# Patient Record
Sex: FEMALE | Race: WHITE | NOT HISPANIC OR LATINO | Employment: OTHER | URBAN - NONMETROPOLITAN AREA
[De-identification: names, ages, dates, MRNs, and addresses within clinical notes are randomized per-mention and may not be internally consistent; named-entity substitution may affect disease eponyms.]

---

## 2019-02-22 ENCOUNTER — HOSPITAL ENCOUNTER (OUTPATIENT)
Dept: PHYSICAL THERAPY | Facility: CLINIC | Age: 59
Setting detail: THERAPIES SERIES
End: 2019-02-22
Attending: FAMILY MEDICINE
Payer: MEDICARE

## 2019-02-22 PROCEDURE — 97110 THERAPEUTIC EXERCISES: CPT | Mod: GP | Performed by: PHYSICAL MEDICINE & REHABILITATION

## 2019-02-22 PROCEDURE — 97162 PT EVAL MOD COMPLEX 30 MIN: CPT | Mod: GP | Performed by: PHYSICAL MEDICINE & REHABILITATION

## 2019-02-22 PROCEDURE — G8979 MOBILITY GOAL STATUS: HCPCS | Mod: GP,CJ | Performed by: PHYSICAL MEDICINE & REHABILITATION

## 2019-02-22 PROCEDURE — G8978 MOBILITY CURRENT STATUS: HCPCS | Mod: GP,CK | Performed by: PHYSICAL MEDICINE & REHABILITATION

## 2019-02-22 NOTE — PROGRESS NOTES
" 02/22/19 0900   General Information   Type of Visit Initial OP Ortho PT Evaluation   Start of Care Date 02/22/19   Referring Physician Sylvester Davidson MD   Patient/Family Goals Statement \"improve overall muscle mass and strength, improve walking, improve core stability\"   Orders Evaluate and Treat   Orders Comment \"core, leg and back strengthening post-op\"   Date of Order 01/22/19   Insurance Type Medicare  (no ionto)   Medical Diagnosis DDD (degenerative disc disease), lumbar; postoperative back pain   Surgical/Medical history reviewed Yes  (s/p lumbar fusion)   General Information Comments PMHx per pt report: anemia, asthma, heart problems, high BP, arthritis, osteoporosis, broken ribs, broken T10 and T12, broken R elbow, tonsilectomy, appendectomy, 4 eye surgeries, bowel and bladder obstruction, bleeding ulcers, knee replacement, back surgery, gall bladder surgery    Body Part(s)   Body Part(s) Lumbar Spine/SI   Presentation and Etiology   Pertinent history of current problem (include personal factors and/or comorbidities that impact the POC) Pt arrived to PT today with complaints of LBP as well as core and LE weakness s/p lumbar fusion (per surgery report: \"Anterior Lumbar Interbody Fusion L2 to S1, Posterior Spine Fusion with Instrumentation L2 to S1, Decompression L2 to S1, Osteotomy - Parker Morales L2 to L5\") performed on 11/7/2018. Pt shared she recieved PT home care from after surgery to about 4 weeks ago. Pt reports she has been performing exercises daily, only able to recall 4-way hip strengthening in standing and isometric core contractions. Pt shared overall feels weakness across B UE and LE since surgery. Pt reports occasional shooting pain into B LE, and numbness. Last surgeon note specifys no bending, lifting over 5# or twisting.    Impairments A. Pain;D. Decreased ROM;F. Decreased strength and endurance;H. Impaired gait;K. Numbness;M. Locking or catching   Functional Limitations perform " activities of daily living   Symptom Location pt shared pain in low back, R arm, B knees, B shoulders   How/Where did it occur Other  (s/p lumbar fusion)   Onset date of current episode/exacerbation 11/07/18   Chronicity New   Pain rating (0-10 point scale) Best (/10);Worst (/10)   Best (/10) 6-7/10   Worst (/10) 10/10   Pain quality A. Sharp;C. Aching;E. Shooting;F. Stabbing;G. Cramping   Frequency of pain/symptoms A. Constant   Pain/symptoms are: The same all the time   Pain/symptoms exacerbated by A. Sitting;C. Lifting;D. Carrying;E. Rest;G. Certain positions;F. Nothing;H. Overhead reach;I. Bending;K. Home tasks   Pain/symptoms eased by B. Walking;C. Rest;E. Changing positions;H. Cold;J. Braces/supports   Progression of symptoms since onset: Unchanged   Prior Level of Function   Prior Level of Function-Mobility Pt currently amb with SPC, notes no AD prior to surgery   Prior Level of Function-ADLs independent, has boyfriend who can assist at home   Current Level of Function   Current Community Support Family/friend caregiver   Patient role/employment history G. Disabled   Living environment Other   Home/community accessibility pt lives in trailer, has ramp in back, stairs in front with railing on both sides   Current equipment-Gait/Locomotion Standard cane;Walker  (uses walker for longer distances outside, otherwise SPC)   Fall Risk Screen   Fall screen completed by PT   Have you fallen 2 or more times in the past year? Yes   Have you fallen and had an injury in the past year? No   Timed Up and Go score (seconds) 10 seconds   Is patient a fall risk? Yes;Department fall risk interventions implemented   Fall screen comments Although pt able to complete test in appropriate time, pt demonstrates poor balance throughout therapy session and therefore fall risk interventions implemented   System Outcome Measures   Outcome Measures Low Back Pain (see Oswestry and Vanda)   Lumbar Spine/SI Objective Findings   Observation  "pt arrived to PT appointment today with SPC, SI belt, and lumbar brace support (TLSO)   Posture pt sits with posterior pelvic tilt, slouched posture   Gait/Locomotion Pt amb throughout clinic with equal stride length, toe out gait pattern, trunk lean to R, cane used in R UE. able to ascend and descend stairs reciprocally without use of handrail   Flexion ROM 6\" from floor   Extension % limited    Right Side Bending ROM 22\" from floor   Left Side Bending ROM 22\" from floor   Lumbar ROM Comment Pt wore braces throughout all ROM and muscle testing   Hip Screen Positive KALEN and maame B   Hip Flexion (L2) Strength R=4-/5, L=4/5   Hip Abduction Strength seated: 5/5 B   Hip Adduction Strength seated: 5/5 B   Knee Flexion Strength 4+/5 B   Knee Extension (L3) Strength 5-/5 B   Ankle Dorsiflexion (L4) Strength 5/5 B   Great Toe Extension (L5) Strength 5/5 B   Ankle Plantar Flexion (S1) Strength 5/5 B   Lumbar/Hip/Knee/Foot Strength Comments hip IR: 4/5 B; hip ER: R=4-/5, L=4/5   Hamstring Flexibility wnl B   Hip Flexor Flexibility wnl B   Quadricep Flexibility wnl B   Obers Flexibility wnl B   Piriformis Flexibility wnl B   SLR neg for sx   Jamey Test normal 1 jt and 2 jt hip flexors B (modified test position)   Crossover SLR neg for sx   Slump Test neg B   Lumbar/SI Special Tests Comments Pt unable to lay prone, therefore prone tests not assessed during evaluation today   Segmental Mobility Not assessed today, will assess as appropriate at future visits   Sensation Testing Pt notes decreased sensation along dermatomal pattern of L1-S1 on R LE compared to L LE   Palpation Pt noted increased tenderness throughout entire low back   Planned Therapy Interventions   Planned Therapy Interventions ADL retraining;IADL retraining;balance training;bed mobility training;gait training;joint mobilization;manual therapy;motor coordination training;neuromuscular re-education;orthotic " fitting/training;ROM;strengthening;stretching;transfer training   Planned Modality Interventions   Planned Modality Interventions Biofeedback;Contrast bath immersion;Cryotherapy;Electrical stimulation;Hot packs;Low level laser therapy;Shortwave diathermy;TENS;Traction;Ultrasound   Clinical Impression   Criteria for Skilled Therapeutic Interventions Met yes, treatment indicated   PT Diagnosis LBP s/p lumbar fusion   Influenced by the following impairments decreased ROM, decreased strength, impaired gait, impaired posture, impaired balance, impaired sensation, pain   Functional limitations due to impairments standing, walking, transfers, lifting, bending, sitting, sleeping   Clinical Presentation Evolving/Changing   Clinical Presentation Rationale multiple co-morbidies, poor historian, PLOF, multiple jt involvement, chronic pain   Clinical Decision Making (Complexity) Moderate complexity   Therapy Frequency 2 times/Week   Predicted Duration of Therapy Intervention (days/wks) 10 weeks   Risk & Benefits of therapy have been explained Yes   Patient, Family & other staff in agreement with plan of care Yes   Clinical Impression Comments Pt is a 58 y.o. female who presented in the PT clinic today with a rehab diagnosis of LBP s/p lumbar fusion as evidenced by decreased ROM, decreased strength, impaired posture, impaired gait, impaired sensation, impaired balance, and pain. Pt's prognosis is made complicated by pt's poor ability to recall recent events and exercises (poor historian), as well as stay on task (needs constant cues to stay on task), and large lumbar fusion. Pt is appropriate for skilled PT to address previously listed impairments in order to decrease difficulty with standing, walking, lifting, sitting and transfering.   Education Assessment   Preferred Learning Style Listening;Reading;Demonstration;Pictures/video   ORTHO GOALS   PT Ortho Eval Goals 1;2;3;4   Ortho Goal 1   Goal Identifier 1   Goal Description Pt  will be able to stand for >30 minutes and report a reduction in sx in order to decrease difficulty with ADLs.    Target Date 03/29/19   Ortho Goal 2   Goal Identifier 2   Goal Description Pt will be able to amb 100' without using AD in order to decrease difficulty with home and community ambulation   Target Date 04/19/19   Ortho Goal 3   Goal Identifier 3   Goal Description Pt will decrease RJ score by 10% indicating decrease difficulty with ADLs.    Target Date 05/03/19   Ortho Goal 4   Goal Identifier 4   Goal Description Pt will be independent with HEP in order to self manage symptoms.   Target Date 05/03/19   Total Evaluation Time   PT Eval, Moderate Complexity Minutes (93232) 35   PT G-Codes   G-code Mobility: Walking and moving around   Mobility: Walking and Moving Around Current Status () CK   Current Mobility Modifier Rationale RJ, Vanda, ROM, strength, gait, clinical judgement, pain   Mobility: Walking and Moving Around Goal Status () CJ   Therapy Certification   Certification date from 02/22/19   Certification date to 05/03/19   Medical Diagnosis DDD (degenerative disc disease), lumbar; postoperative back pain         Please contact me with any questions or concerns.    Thank you for your referral,     Rena Elena, PT, DPT  Physical Therapist  [facility] [place] [address]  [phone]

## 2019-02-22 NOTE — PROGRESS NOTES
[facility]          OUTPATIENT PHYSICAL THERAPY ORTHOPEDIC EVALUATION  PLAN OF TREATMENT FOR OUTPATIENT REHABILITATION  (COMPLETE FOR INITIAL CLAIMS ONLY)  Patient's Last Name, First Name, M.I.  YOB: 1960  Melanie Cox       Provider s Name:  [facility]   Medical Record No.  [id number]   Start of Care Date:  02/22/19   Onset Date:  11/07/18   Type:     _X__PT   ___OT   ___SLP Medical Diagnosis:  DDD (degenerative disc disease), lumbar; postoperative back pain     PT Diagnosis:  LBP s/p lumbar fusion   Visits from SOC:  1      _________________________________________________________________________________  Plan of Treatment/Functional Goals:  ADL retraining, IADL retraining, balance training, bed mobility training, gait training, joint mobilization, manual therapy, motor coordination training, neuromuscular re-education, orthotic fitting/training, ROM, strengthening, stretching, transfer training     Biofeedback, Contrast bath immersion, Cryotherapy, Electrical stimulation, Hot packs, Low level laser therapy, Shortwave diathermy, TENS, Traction, Ultrasound     Goals  Goal Identifier: 1  Goal Description: Pt will be able to stand for >30 minutes and report a reduction in sx in order to decrease difficulty with ADLs.   Target Date: 03/29/19    Goal Identifier: 2  Goal Description: Pt will be able to amb 100' without using AD in order to decrease difficulty with home and community ambulation  Target Date: 04/19/19    Goal Identifier: 3  Goal Description: Pt will decrease RJ score by 10% indicating decrease difficulty with ADLs.   Target Date: 05/03/19    Goal Identifier: 4  Goal Description: Pt will be independent with HEP in order to self manage symptoms.  Target Date: 05/03/19      Therapy Frequency:  2 times/Week  Predicted Duration of Therapy Intervention:  10 weeks    Rena Elena PT                 I CERTIFY THE NEED FOR THESE SERVICES FURNISHED UNDER         THIS PLAN OF TREATMENT AND WHILE UNDER MY CARE .             Physician Signature               Date    X_____________________________________________________                             Certification Date From:  02/22/19   Certification Date To:  05/03/19    Referring Provider:  Sylvester Davidson MD    Initial Assessment        See Epic Evaluation Start of Care Date: 02/22/19

## 2019-02-27 ENCOUNTER — HOSPITAL ENCOUNTER (OUTPATIENT)
Dept: PHYSICAL THERAPY | Facility: CLINIC | Age: 59
Setting detail: THERAPIES SERIES
End: 2019-02-27
Attending: FAMILY MEDICINE
Payer: MEDICARE

## 2019-02-27 PROCEDURE — 97110 THERAPEUTIC EXERCISES: CPT | Mod: GP | Performed by: PHYSICAL MEDICINE & REHABILITATION

## 2019-03-01 ENCOUNTER — HOSPITAL ENCOUNTER (OUTPATIENT)
Dept: PHYSICAL THERAPY | Facility: CLINIC | Age: 59
Setting detail: THERAPIES SERIES
End: 2019-03-01
Attending: FAMILY MEDICINE
Payer: MEDICARE

## 2019-03-01 PROCEDURE — 97110 THERAPEUTIC EXERCISES: CPT | Mod: GP | Performed by: PHYSICAL MEDICINE & REHABILITATION

## 2019-03-06 ENCOUNTER — HOSPITAL ENCOUNTER (OUTPATIENT)
Dept: PHYSICAL THERAPY | Facility: CLINIC | Age: 59
Setting detail: THERAPIES SERIES
End: 2019-03-06
Attending: FAMILY MEDICINE
Payer: MEDICARE

## 2019-03-06 PROCEDURE — 97110 THERAPEUTIC EXERCISES: CPT | Mod: GP | Performed by: PHYSICAL MEDICINE & REHABILITATION

## 2019-03-08 ENCOUNTER — HOSPITAL ENCOUNTER (OUTPATIENT)
Dept: PHYSICAL THERAPY | Facility: CLINIC | Age: 59
Setting detail: THERAPIES SERIES
End: 2019-03-08
Attending: FAMILY MEDICINE
Payer: MEDICARE

## 2019-03-08 PROCEDURE — 97110 THERAPEUTIC EXERCISES: CPT | Mod: GP | Performed by: PHYSICAL MEDICINE & REHABILITATION

## 2019-03-13 ENCOUNTER — HOSPITAL ENCOUNTER (OUTPATIENT)
Dept: PHYSICAL THERAPY | Facility: CLINIC | Age: 59
Setting detail: THERAPIES SERIES
End: 2019-03-13
Attending: FAMILY MEDICINE
Payer: MEDICARE

## 2019-03-13 PROCEDURE — 97110 THERAPEUTIC EXERCISES: CPT | Mod: GP | Performed by: PHYSICAL MEDICINE & REHABILITATION

## 2019-03-15 ENCOUNTER — HOSPITAL ENCOUNTER (OUTPATIENT)
Dept: PHYSICAL THERAPY | Facility: CLINIC | Age: 59
Setting detail: THERAPIES SERIES
End: 2019-03-15
Attending: FAMILY MEDICINE
Payer: MEDICARE

## 2019-03-15 PROCEDURE — 97110 THERAPEUTIC EXERCISES: CPT | Mod: GP | Performed by: PHYSICAL MEDICINE & REHABILITATION

## 2019-03-27 ENCOUNTER — HOSPITAL ENCOUNTER (OUTPATIENT)
Dept: PHYSICAL THERAPY | Facility: CLINIC | Age: 59
Setting detail: THERAPIES SERIES
End: 2019-03-27
Attending: FAMILY MEDICINE
Payer: MEDICARE

## 2019-03-27 PROCEDURE — 97110 THERAPEUTIC EXERCISES: CPT | Mod: GP | Performed by: PHYSICAL MEDICINE & REHABILITATION

## 2019-03-27 PROCEDURE — 97112 NEUROMUSCULAR REEDUCATION: CPT | Mod: GP | Performed by: PHYSICAL MEDICINE & REHABILITATION

## 2019-04-05 ENCOUNTER — HOSPITAL ENCOUNTER (OUTPATIENT)
Dept: PHYSICAL THERAPY | Facility: CLINIC | Age: 59
Setting detail: THERAPIES SERIES
End: 2019-04-05
Attending: FAMILY MEDICINE
Payer: MEDICARE

## 2019-04-05 PROCEDURE — 97110 THERAPEUTIC EXERCISES: CPT | Mod: GP | Performed by: PHYSICAL MEDICINE & REHABILITATION

## 2019-04-05 PROCEDURE — 97112 NEUROMUSCULAR REEDUCATION: CPT | Mod: GP | Performed by: PHYSICAL MEDICINE & REHABILITATION

## 2019-04-17 ENCOUNTER — HOSPITAL ENCOUNTER (OUTPATIENT)
Dept: PHYSICAL THERAPY | Facility: CLINIC | Age: 59
Setting detail: THERAPIES SERIES
End: 2019-04-17
Attending: FAMILY MEDICINE
Payer: MEDICARE

## 2019-04-17 PROCEDURE — 97110 THERAPEUTIC EXERCISES: CPT | Mod: GP | Performed by: PHYSICAL MEDICINE & REHABILITATION

## 2019-04-17 PROCEDURE — 97112 NEUROMUSCULAR REEDUCATION: CPT | Mod: GP | Performed by: PHYSICAL MEDICINE & REHABILITATION

## 2019-04-18 NOTE — PROGRESS NOTES
"Outpatient Physical Therapy Progress Note     Patient: Melanie Cox  : 1960    Beginning/End Dates of Reporting Period:  2019 to 2019    Referring Provider: Sylvester Davidson MD    Therapy Diagnosis: LBP s/p lumbar fusion     Client Self Report: Pt reports exercises going well at home, notes more stable footwear seem to help with balance exercsies. Feeling better this week compared to last therapy session and visit to ER.    Objective Measurements:  Objective Measure: lumbar ROM (with brace on)  Details: flexion: fingertips to toes, extension: 75% limited, SB R: 16\" from floor, SB L: 19\" from floor (no pain)  Objective Measure: Gait observation  Details: Pt able to amb 60' during therapy session today without use of SPC     Outcome Measures (most recent score):  Vanda STarT Sub-Score (Q5-9): 1  Vanda STarT Total Score (all 9): 2  (Vanda: low (medium at initial eval))       Goals:  Goal Identifier 1   Goal Description Pt will be able to stand for >30 minutes and report a reduction in sx in order to decrease difficulty with ADLs.    Target Date 19   Date Met  19   Progress:     Goal Identifier 2    Goal Description Pt will be able to amb 100' without using AD in order to decrease difficulty with home and community ambulation   Target Date 19   Date Met      Progress: (pt able to amb 60' without AD in clinic today)     Goal Identifier 3   Goal Description Pt will decrease RJ score by 10% indicating decrease difficulty with ADLs.    Target Date 19   Date Met  19   Progress:     Goal Identifier 4   Goal Description Pt will be independent with HEP in order to self manage symptoms.   Target Date 19   Date Met      Progress: (long term goal, I with current HEP)     Progress Toward Goals:   Progress this reporting period: Pt has attended 10 PT sessions, achieving 2/4 short term and long term goals. Pt cont to progress toward remaining 2 goals and has nearly met both " remaining 2 goals. Pt's ROM, strength and balance have all improved with exercises. PT cont to focus on strengthening exercises to improve transfers and amb. Pt would benefit from continued skilled PT in order to address residual balance and strength deficits s/p lumbar fusion in order to decrease difficulty with home and community amb.    Plan:  Continue therapy per current plan of care.    Discharge:  No    Please contact me with any questions or concerns.    Thank you for your referral,     Rena Elena, PT, DPT  Physical Therapist  [facility] - [address] [address] [phone]

## 2019-04-29 ENCOUNTER — HOSPITAL ENCOUNTER (OUTPATIENT)
Dept: PHYSICAL THERAPY | Facility: CLINIC | Age: 59
Setting detail: THERAPIES SERIES
End: 2019-04-29
Attending: FAMILY MEDICINE
Payer: MEDICARE

## 2019-04-29 PROCEDURE — 97162 PT EVAL MOD COMPLEX 30 MIN: CPT | Mod: GP | Performed by: PHYSICAL MEDICINE & REHABILITATION

## 2019-04-29 PROCEDURE — 97110 THERAPEUTIC EXERCISES: CPT | Mod: GP | Performed by: PHYSICAL MEDICINE & REHABILITATION

## 2019-04-29 NOTE — PROGRESS NOTES
"   04/29/19 1400   General Information   Type of Visit Initial OP Ortho PT Evaluation   Start of Care Date 04/29/19   Referring Physician Sylvester Davidson MD   Patient/Family Goals Statement \"improve reaching into cupboards, improve ROM, decrease pain, improve laying on R side, use vaccum with less pain\"   Orders Evaluate and Treat   Date of Order 03/28/19   Insurance Type Medicare;Other   Insurance Comments/Visits Authorized MEDICARE; [employer]: no ionto, cert required; Auth 2 weeks prior to the 21ST visit,    Medical Diagnosis Right shoulder pain - s/p replacement   Surgical/Medical history reviewed Yes   Precautions/Limitations no known precautions/limitations   General Information Comments PMHx per pt report: anemia, asthma, heart problems, high BP, arthritis, osteoporosis, broken ribs, broken T10 and T12, broken R elbow, tonsilectomy, appendectomy, 4 eye surgeries, bowel and bladder obstruction, bleeding ulcers, knee replacement, back surgery, gall bladder surgery    Body Part(s)   Body Part(s) Shoulder   Presentation and Etiology   Pertinent history of current problem (include personal factors and/or comorbidities that impact the POC) Pt arrived to PT today for R shoulder pain. Pt reports pain started a couple months ago. Has pain in B shoulders but R worse than L. Pt shared entire shoulder painful, notes numbness and tingling in R hand. Pt shared pain in R elbow as well, noting part of her radius was cut out. 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 Has had shoulder replacement on R 20 years ago. Pt accompanied to PT eval by boyfriend. Pt recently had surgery on low back and in high pain today, demonstrating slow movement.    Impairments A. Pain;B. Decreased WB tolerance;D. Decreased ROM;E. Decreased flexibility;F. Decreased strength and endurance;K. Numbness;L. Tingling  (tingling in entire hand)   Functional Limitations perform activities of daily living   Symptom Location R shoulder pain with radicular " sx down R UE   How/Where did it occur From insidious onset;Other  (shared replacement on R shoulder 20 years ago)   Onset date of current episode/exacerbation 03/28/19   Chronicity Chronic   Pain rating (0-10 point scale) Best (/10);Worst (/10)   Best (/10) 7   Worst (/10) 10   Pain quality A. Sharp;B. Dull;C. Aching   Frequency of pain/symptoms A. Constant   Pain/symptoms are: The same all the time   Pain/symptoms exacerbated by C. Lifting;D. Carrying;E. Rest;G. Certain positions;H. Overhead reach;J. ADL;K. Home tasks   Pain/symptoms eased by A. Sitting;B. Walking;E. Changing positions;F. Certain positions   Progression of symptoms since onset: Worsened   Prior Level of Function   Prior Level of Function-Mobility Pt currently amb with SPC, notes no AD prior to recent back surgery   Prior Level of Function-ADLs independent, has boyfriend who can assist at home   Current Level of Function   Current Community Support Family/friend caregiver   Patient role/employment history G. Disabled   Living environment Other   Home/community accessibility pt lives in trailer, has ramp in back, stairs in front with railing on both sides   Current equipment-Gait/Locomotion Standard cane;Walker   Current equipment-ADL None   Fall Risk Screen   Fall screen completed by PT   Have you fallen 2 or more times in the past year? No   Have you fallen and had an injury in the past year? No   Is patient a fall risk? Yes;Department fall risk interventions implemented   Fall screen comments NT today as pt in severe pain from recent surgery. Will assess at upcoming therapy sessions. Pt currently in PT for LBP and was already deemed fall risk.    Abuse Screen (yes response referral indicated)   Physical Signs of Abuse Present no   Functional Scales   Functional Scales Other   Other Scales  SPADI: 70.77% disability   Shoulder Objective Findings   Side (if bilateral, select both right and left) Right   Posture forward head, rounded shoulders B,  increased thoracic kyphosis   Cervical Screen (ROM, quadrant) all motions wnl, pt notes increased sx with all motions   Thoracic Mobility Screen wfl all directions, increased pain with all motions   Thoracic Outlet Syndrom (Cayetano, Adson, Erica, Bateman) Cayetano: neg   Scapulothoracic Rhythm poor   Pec Minor (supine) Flexibility limited B   Neer's Test pos   Hernandez-Santo Test pos   Coracoid Test pos   Bursa Test pos   Yell's Test neg   Load and Shift Test neg   Relocation Test pos   Sulcus Test neg   Crossover Test pos   Palpation Pt noted increased sx with min-mod palpation of all tissues and bony prominances of R shoulder   Accessory Motion/Joint Mobility hypomobility of R ACJ, SCJ and GHJ in all directions   Right Shoulder Flexion AROM 130* (pain)   Right Shoulder Flexion PROM 150* (pain)   Right Shoulder Abduction AROM 78* (pain)   Right Shoulder Abduction PROM 90* (pain)   Right Shoulder ER AROM at 0* abd: 88* (pt noted min increase in sx)   Right Shoulder ER PROM in 90* abd* : 10* (pain)   Right Shoulder IR AROM at 0* abd: to stomach (pain)   Right Shoulder IR PROM in 90* abd: 10* (pain)   Right Shoulder Flexion Strength 4/5 (pain)   Right Shoulder Abduction Strength 4/5 (pain)   Right Shoulder ER Strength 4/5 (pain)   Right Shoulder IR Strength 4/5 (pain)   Right Shoulder Extension Strength 5-/5 (pain)   Planned Therapy Interventions   Planned Therapy Interventions ADL retraining;IADL retraining;balance training;bed mobility training;joint mobilization;manual therapy;motor coordination training;neuromuscular re-education;orthotic fitting/training;ROM;strengthening;stretching;transfer training   Planned Modality Interventions   Planned Modality Interventions Biofeedback;Contrast bath immersion;Cryotherapy;Electrical stimulation;Hot packs;Low level laser therapy;Shortwave diathermy;TENS;Traction;Ultrasound   Clinical Impression   Criteria for Skilled Therapeutic Interventions Met yes, treatment indicated   PT  Diagnosis R shoulder pain   Influenced by the following impairments decreased ROM, decreasd strength, impaired posture, radicular sx, pain   Functional limitations due to impairments reaching, lifting, carrying, sleeping, housework   Clinical Presentation Evolving/Changing   Clinical Presentation Rationale multiple co-morbidies, poor historian, PLOF, multiple jt involvement, chronic pain, severity of sx, chronicity of sx   Clinical Decision Making (Complexity) Moderate complexity   Therapy Frequency 2 times/Week   Predicted Duration of Therapy Intervention (days/wks) 10 weeks   Risk & Benefits of therapy have been explained Yes   Patient, Family & other staff in agreement with plan of care Yes   Clinical Impression Comments Pt is a 58 y.o. female who presented to the PT clinic today with a rehab diagnosis of R shoulder pain as evidenced by decreased ROM, decreasd strength, impaired posture, radicular sx, and pain. Pt is appropriate for skilled PT to address previously listed impairments in order to decrease difficulty with reaching, lifting, sleeping and housework.    Education Assessment   Preferred Learning Style Listening;Reading;Demonstration;Pictures/video   Barriers to Learning No barriers   ORTHO GOALS   PT Ortho Eval Goals 1;2;3;4   Ortho Goal 1   Goal Identifier 1   Goal Description Pt will be able to flex R shoulder 150* in order to decrease difficulty with reaching high shelf.    Target Date 05/27/19   Ortho Goal 2   Goal Identifier 2   Goal Description Pt will report a reduction in sx while laying on R side in order to decrease difficulty with sleeping.   Target Date 06/10/19   Ortho Goal 3   Goal Identifier 3   Goal Description Pt will be able to demonstrate vaccuming using R UE without increase in sx in order to decrease difficulty with housework.    Target Date 07/12/19   Ortho Goal 4   Goal Identifier 4   Goal Description Pt will be independent with HEP in order to self manage symptoms.   Target Date  07/12/19   Total Evaluation Time   PT Eval, Moderate Complexity Minutes (56874) 30   Therapy Certification   Certification date from 04/29/19   Certification date to 07/12/19   Medical Diagnosis Right shoulder pain - s/p replacement       Please contact me with any questions or concerns.    Thank you for your referral,     Rena Elena, PT, DPT  Physical Therapist  [address] [address] [phone]

## 2019-04-29 NOTE — PROGRESS NOTES
[facility]          OUTPATIENT PHYSICAL THERAPY ORTHOPEDIC EVALUATION  PLAN OF TREATMENT FOR OUTPATIENT REHABILITATION  (COMPLETE FOR INITIAL CLAIMS ONLY)  Patient's Last Name, First Name, M.I.  YOB: 1960  Melanie Cox    Provider s Name:  [facility]   Medical Record No.  [id number]   Start of Care Date:  04/29/19   Onset Date:  03/28/19   Type:     _X__PT   ___OT   ___SLP Medical Diagnosis:  Right shoulder pain - s/p replacement     PT Diagnosis:  R shoulder pain   Visits from SOC:  1      _________________________________________________________________________________  Plan of Treatment/Functional Goals:  ADL retraining, IADL retraining, balance training, bed mobility training, joint mobilization, manual therapy, motor coordination training, neuromuscular re-education, orthotic fitting/training, ROM, strengthening, stretching, transfer training     Biofeedback, Contrast bath immersion, Cryotherapy, Electrical stimulation, Hot packs, Low level laser therapy, Shortwave diathermy, TENS, Traction, Ultrasound     Goals  Goal Identifier: 1  Goal Description: Pt will be able to flex R shoulder 150* in order to decrease difficulty with reaching high shelf.   Target Date: 05/27/19    Goal Identifier: 2  Goal Description: Pt will report a reduction in sx while laying on R side in order to decrease difficulty with sleeping.  Target Date: 06/10/19    Goal Identifier: 3  Goal Description: Pt will be able to demonstrate vaccuming using R UE without increase in sx in order to decrease difficulty with housework.   Target Date: 07/12/19    Goal Identifier: 4  Goal Description: Pt will be independent with HEP in order to self manage symptoms.  Target Date: 07/12/19      Therapy Frequency:  2 times/Week  Predicted Duration of Therapy Intervention:  10 weeks    Rena Elena PT                 I CERTIFY THE NEED FOR THESE SERVICES FURNISHED UNDER        THIS PLAN OF  TREATMENT AND WHILE UNDER MY CARE .             Physician Signature               Date    X_____________________________________________________                             Certification Date From:  04/29/19   Certification Date To:  07/12/19    Referring Provider:  Sylvester Davidson MD    Initial Assessment        See Epic Evaluation Start of Care Date: 04/29/19

## 2019-05-09 ENCOUNTER — HOSPITAL ENCOUNTER (OUTPATIENT)
Dept: PHYSICAL THERAPY | Facility: CLINIC | Age: 59
Setting detail: THERAPIES SERIES
End: 2019-05-09
Attending: FAMILY MEDICINE
Payer: MEDICARE

## 2019-05-10 NOTE — PROGRESS NOTES
"Outpatient Physical Therapy Discharge Note     Patient: Melanie Cox  : 1960    Beginning/End Dates of Reporting Period:  2019 to 2019    Referring Provider: Sylvester Davidson MD    Therapy Diagnosis: LBP s/p lumbar fusion     Client Self Report: Pt reports exercises going well at home, notes more stable footwear seem to help with balance exercises. Feeling better this week compared to last therapy session and visit to ER.    Objective Measurements:  Objective Measure: lumbar ROM (with brace on)  Details: flexion: fingertips to toes, extension: 75% limited, SB R: 16\" from floor, SB L: 19\" from floor (no pain)  Objective Measure: Gait observation  Details: Pt able to amb 60' during therapy session today without use of SPC    Outcome Measures (most recent score):  Vanda: low (medium at initial eval)    Goals:  Goal Identifier 1   Goal Description Pt will be able to stand for >30 minutes and report a reduction in sx in order to decrease difficulty with ADLs.    Target Date 19   Date Met  19   Progress:     Goal Identifier 2   Goal Description Pt will be able to amb 100' without using AD in order to decrease difficulty with home and community ambulation   Target Date 19   Date Met      Progress:  (pt able to amb 60' without AD in clinic 2019)     Goal Identifier 3   Goal Description Pt will decrease RJ score by 10% indicating decrease difficulty with ADLs.    Target Date 19   Date Met  19   Progress:     Goal Identifier 4   Goal Description Pt will be independent with HEP in order to self manage symptoms.   Target Date 19   Date Met      Progress:  (long term goal, I with current HEP)     Progress Toward Goals:   Progress this reporting period: PT discussed with pt limited attendance with coming to PT appointments and inability to therefore progress exercises. Pt acknowledged decreased compliance over the last 3 weeks and agreed with plan to discharge and " cont exercises independently at home. No further progress is expected due to pt's limited PT compliance and poor follow through with making appointments. PT suggested pt cont exercises from last given HEP over the next 6-8 weeks to cont to improve mobility and strength of lumbar spine.     Plan:  Discharge from therapy.    Discharge:    Reason for Discharge: No further expectation of progress.  Patient chooses to discontinue therapy (cont exercises independently at home).  Patient has not made expected progress due to interrupted treatment attendance.    Equipment Issued: [employer]    Discharge Plan: Patient to continue home program.      Please contact me with any questions or concerns.    Thank you for your referral,     Rena Elena, PT, DPT  Physical Therapist  [facility] - [address] [address] [phone]

## 2019-06-06 ENCOUNTER — HOSPITAL ENCOUNTER (OUTPATIENT)
Dept: PHYSICAL THERAPY | Facility: CLINIC | Age: 59
Setting detail: THERAPIES SERIES
End: 2019-06-06
Attending: FAMILY MEDICINE
Payer: MEDICARE

## 2019-06-06 PROCEDURE — 97110 THERAPEUTIC EXERCISES: CPT | Mod: GP | Performed by: PHYSICAL MEDICINE & REHABILITATION

## 2019-06-06 PROCEDURE — 97140 MANUAL THERAPY 1/> REGIONS: CPT | Mod: GP | Performed by: PHYSICAL MEDICINE & REHABILITATION

## 2019-06-06 PROCEDURE — 97162 PT EVAL MOD COMPLEX 30 MIN: CPT | Mod: GP | Performed by: PHYSICAL MEDICINE & REHABILITATION

## 2019-06-06 NOTE — PROGRESS NOTES
[facility]          OUTPATIENT PHYSICAL THERAPY ORTHOPEDIC EVALUATION  PLAN OF TREATMENT FOR OUTPATIENT REHABILITATION  (COMPLETE FOR INITIAL CLAIMS ONLY)  Patient's Last Name, First Name, M.I.  YOB: 1960  Melanie Cox    Provider s Name:  [facility]   Medical Record No.  [id number]   Start of Care Date:  06/06/19   Onset Date:  05/06/19(fractured R collar bone ~1 month ago)   Type:     _X__PT   ___OT   ___SLP Medical Diagnosis:  Closed nondisplaced fracture of acromial end of right clavicle, initial encounter     PT Diagnosis:  R shoulder pain   Visits from SOC:  1      _________________________________________________________________________________  Plan of Treatment/Functional Goals:  ADL retraining, IADL retraining, bed mobility training, joint mobilization, manual therapy, motor coordination training, neuromuscular re-education, orthotic fitting/training, ROM, strengthening, stretching, transfer training     Biofeedback, Contrast bath immersion, Cryotherapy, Electrical stimulation, Hot packs, Low level laser therapy, Shortwave diathermy, TENS, Traction, Ultrasound, Iontophoresis     Goals  Goal Identifier: 1  Goal Description: Pt will report a reduction in sx while laying on R side in order to decrease difficulty with sleeping.   Target Date: 07/04/19    Goal Identifier: 2  Goal Description: Pt will be able to flex R shoulder to 150* in order to decrease difficulty with reaching into cupboards.   Target Date: 07/18/19    Goal Identifier: 3  Goal Description: Pt will be able to demonstrate vaccuming using R UE without increase in sx in order to decrease difficulty with housework.   Target Date: 08/01/19    Goal Identifier: 4  Goal Description: Pt will be independent with HEP in order to self manage symptoms.  Target Date: 08/16/19        Therapy Frequency:  2 times/Week  Predicted Duration of Therapy Intervention:  10 weeks    Rena Elena PT                  I CERTIFY THE NEED FOR THESE SERVICES FURNISHED UNDER        THIS PLAN OF TREATMENT AND WHILE UNDER MY CARE .             Physician Signature               Date    X_____________________________________________________                             Certification Date From:  06/06/19   Certification Date To:  08/16/19    Referring Provider:  Kelton Walton    Initial Assessment        See Epic Evaluation Start of Care Date: 06/06/19
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
## 2019-06-13 NOTE — ADDENDUM NOTE
Encounter addended by: Rena Elena, PT on: 6/13/2019 8:32 AM   Actions taken: Sign clinical note, Episode resolved
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
## 2019-06-14 ENCOUNTER — HOSPITAL ENCOUNTER (OUTPATIENT)
Dept: PHYSICAL THERAPY | Facility: CLINIC | Age: 59
Setting detail: THERAPIES SERIES
End: 2019-06-14
Attending: FAMILY MEDICINE
Payer: MEDICARE

## 2019-06-14 PROCEDURE — 97110 THERAPEUTIC EXERCISES: CPT | Mod: GP | Performed by: PHYSICAL MEDICINE & REHABILITATION

## 2019-06-25 ENCOUNTER — HOSPITAL ENCOUNTER (OUTPATIENT)
Dept: PHYSICAL THERAPY | Facility: CLINIC | Age: 59
Setting detail: THERAPIES SERIES
End: 2019-06-25
Attending: FAMILY MEDICINE
Payer: MEDICARE

## 2019-06-25 PROCEDURE — 97110 THERAPEUTIC EXERCISES: CPT | Mod: GP | Performed by: PHYSICAL MEDICINE & REHABILITATION

## 2019-08-09 NOTE — PROGRESS NOTES
Outpatient Physical Therapy Discharge Note     Patient: Melanie Cox  : 1960    Beginning/End Dates of Reporting Period:  2019 to 2019    Referring Provider: Kelton Sheets Diagnosis: R shoulder pain    Patient did not return for follow up treatments as directed.  Goal status and current objective information is therefore unknown.  Discharge from PT services at this time for this episode of treatment. Please see attached documentation under this episode of care for further information including dates of service, start of care date, referring physician, Dx, treatment plan, treatments, etc.    Please contact me with any questions or concerns.    Thank you for your referral.    Rena Elena, PT, DPT  [place] [address]  [phone]

## 2019-08-09 NOTE — ADDENDUM NOTE
Encounter addended by: Rena Elena, PT on: 8/9/2019 8:33 AM   Actions taken: Sign clinical note, Episode resolved

## 2021-03-30 NOTE — PROGRESS NOTES
Outpatient Physical Therapy Discharge Note     Patient: Melanie Cox  : 1960    Beginning/End Dates of Reporting Period:  2019 to 2019    Referring Provider: Sylvester Davidson MD    Therapy Diagnosis: LBP s/p lumbar fusion    Patient did not return for follow up treatments as directed.  Goal status and current objective information is therefore unknown.  Discharge from PT services at this time for this episode of treatment. Please see attached documentation under this episode of care for further information including dates of service, start of care date, referring physician, Dx, treatment plan, treatments, etc.    Please contact me with any questions or concerns.    Thank you for your referral.    Rena Elena, PT, DPT  Physical Therapist  [address] [address] [phone]

## 2021-03-30 NOTE — ADDENDUM NOTE
Encounter addended by: Rena Elena, PT on: 3/30/2021 10:54 AM   Actions taken: Clinical Note Signed, Episode resolved

## 2023-01-01 ENCOUNTER — APPOINTMENT (OUTPATIENT)
Dept: GENERAL RADIOLOGY | Facility: CLINIC | Age: 63
DRG: 871 | End: 2023-01-01
Attending: HOSPITALIST
Payer: COMMERCIAL

## 2023-01-01 ENCOUNTER — PATIENT OUTREACH (OUTPATIENT)
Dept: CARE COORDINATION | Facility: CLINIC | Age: 63
End: 2023-01-01
Payer: COMMERCIAL

## 2023-01-01 ENCOUNTER — APPOINTMENT (OUTPATIENT)
Dept: PHYSICAL THERAPY | Facility: CLINIC | Age: 63
DRG: 871 | End: 2023-01-01
Attending: INTERNAL MEDICINE
Payer: COMMERCIAL

## 2023-01-01 ENCOUNTER — TRANSFERRED RECORDS (OUTPATIENT)
Dept: HEALTH INFORMATION MANAGEMENT | Facility: CLINIC | Age: 63
End: 2023-01-01

## 2023-01-01 ENCOUNTER — APPOINTMENT (OUTPATIENT)
Dept: CT IMAGING | Facility: CLINIC | Age: 63
DRG: 871 | End: 2023-01-01
Attending: HOSPITALIST
Payer: COMMERCIAL

## 2023-01-01 ENCOUNTER — HOSPITAL ENCOUNTER (INPATIENT)
Facility: CLINIC | Age: 63
LOS: 10 days | Discharge: HOME | DRG: 871 | End: 2023-09-02
Attending: INTERNAL MEDICINE | Admitting: INTERNAL MEDICINE
Payer: COMMERCIAL

## 2023-01-01 VITALS
TEMPERATURE: 98 F | OXYGEN SATURATION: 91 % | DIASTOLIC BLOOD PRESSURE: 76 MMHG | HEART RATE: 100 BPM | WEIGHT: 145.5 LBS | HEIGHT: 62 IN | SYSTOLIC BLOOD PRESSURE: 119 MMHG | BODY MASS INDEX: 26.78 KG/M2 | RESPIRATION RATE: 16 BRPM

## 2023-01-01 DIAGNOSIS — J44.9 CHRONIC OBSTRUCTIVE PULMONARY DISEASE, UNSPECIFIED COPD TYPE (H): ICD-10-CM

## 2023-01-01 DIAGNOSIS — R52 PAIN: Primary | Chronic | ICD-10-CM

## 2023-01-01 LAB
ABO/RH(D): NORMAL
ALBUMIN SERPL BCG-MCNC: 2.4 G/DL (ref 3.5–5.2)
ALBUMIN SERPL BCG-MCNC: 2.6 G/DL (ref 3.5–5.2)
ALBUMIN SERPL BCG-MCNC: 2.8 G/DL (ref 3.5–5.2)
ALP SERPL-CCNC: 127 U/L (ref 35–104)
ALP SERPL-CCNC: 135 U/L (ref 35–104)
ALP SERPL-CCNC: 136 U/L (ref 35–104)
ALP SERPL-CCNC: 143 U/L (ref 35–104)
ALP SERPL-CCNC: 162 U/L (ref 35–104)
ALT SERPL W P-5'-P-CCNC: 150 U/L (ref 0–50)
ALT SERPL W P-5'-P-CCNC: 36 U/L (ref 0–50)
ALT SERPL W P-5'-P-CCNC: 60 U/L (ref 0–50)
ALT SERPL W P-5'-P-CCNC: 74 U/L (ref 0–50)
ALT SERPL W P-5'-P-CCNC: 95 U/L (ref 0–50)
AMYLASE SERPL-CCNC: 22 U/L (ref 28–100)
ANION GAP SERPL CALCULATED.3IONS-SCNC: 10 MMOL/L (ref 7–15)
ANION GAP SERPL CALCULATED.3IONS-SCNC: 11 MMOL/L (ref 7–15)
ANION GAP SERPL CALCULATED.3IONS-SCNC: 13 MMOL/L (ref 7–15)
ANION GAP SERPL CALCULATED.3IONS-SCNC: 15 MMOL/L (ref 7–15)
ANION GAP SERPL CALCULATED.3IONS-SCNC: 9 MMOL/L (ref 7–15)
ANTIBODY SCREEN: NEGATIVE
AST SERPL W P-5'-P-CCNC: 12 U/L (ref 0–45)
AST SERPL W P-5'-P-CCNC: 21 U/L (ref 0–45)
AST SERPL W P-5'-P-CCNC: 24 U/L (ref 0–45)
AST SERPL W P-5'-P-CCNC: 25 U/L (ref 0–45)
AST SERPL W P-5'-P-CCNC: 37 U/L (ref 0–45)
ATRIAL RATE - MUSE: 100 BPM
ATRIAL RATE - MUSE: 117 BPM
BACTERIA BLD CULT: NO GROWTH
BACTERIA BLD CULT: NO GROWTH
BACTERIA SPT CULT: NORMAL
BACTERIA SPT CULT: NORMAL
BASOPHILS # BLD AUTO: 0 10E3/UL (ref 0–0.2)
BASOPHILS # BLD AUTO: 0 10E3/UL (ref 0–0.2)
BASOPHILS NFR BLD AUTO: 0 %
BASOPHILS NFR BLD AUTO: 0 %
BILIRUB DIRECT SERPL-MCNC: 0.25 MG/DL (ref 0–0.3)
BILIRUB DIRECT SERPL-MCNC: 0.36 MG/DL (ref 0–0.3)
BILIRUB DIRECT SERPL-MCNC: 0.36 MG/DL (ref 0–0.3)
BILIRUB SERPL-MCNC: 0.5 MG/DL
BILIRUB SERPL-MCNC: 0.5 MG/DL
BILIRUB SERPL-MCNC: 0.7 MG/DL
BILIRUB SERPL-MCNC: 0.7 MG/DL
BILIRUB SERPL-MCNC: 1.4 MG/DL
BUN SERPL-MCNC: 12.1 MG/DL (ref 8–23)
BUN SERPL-MCNC: 12.7 MG/DL (ref 8–23)
BUN SERPL-MCNC: 3.2 MG/DL (ref 8–23)
BUN SERPL-MCNC: 6.2 MG/DL (ref 8–23)
BUN SERPL-MCNC: 6.7 MG/DL (ref 8–23)
BUN SERPL-MCNC: 7.6 MG/DL (ref 8–23)
BUN SERPL-MCNC: 8.7 MG/DL (ref 8–23)
CA-I BLD-MCNC: 4 MG/DL (ref 4.4–5.2)
CALCIUM SERPL-MCNC: 7.4 MG/DL (ref 8.8–10.2)
CALCIUM SERPL-MCNC: 7.8 MG/DL (ref 8.8–10.2)
CALCIUM SERPL-MCNC: 8 MG/DL (ref 8.8–10.2)
CALCIUM SERPL-MCNC: 8.1 MG/DL (ref 8.8–10.2)
CALCIUM SERPL-MCNC: 8.5 MG/DL (ref 8.8–10.2)
CALCIUM SERPL-MCNC: 8.8 MG/DL (ref 8.8–10.2)
CALCIUM SERPL-MCNC: 9.5 MG/DL (ref 8.8–10.2)
CHLORIDE SERPL-SCNC: 100 MMOL/L (ref 98–107)
CHLORIDE SERPL-SCNC: 100 MMOL/L (ref 98–107)
CHLORIDE SERPL-SCNC: 102 MMOL/L (ref 98–107)
CHLORIDE SERPL-SCNC: 102 MMOL/L (ref 98–107)
CHLORIDE SERPL-SCNC: 104 MMOL/L (ref 98–107)
CHLORIDE SERPL-SCNC: 105 MMOL/L (ref 98–107)
CHLORIDE SERPL-SCNC: 97 MMOL/L (ref 98–107)
CREAT SERPL-MCNC: 0.41 MG/DL (ref 0.51–0.95)
CREAT SERPL-MCNC: 0.41 MG/DL (ref 0.51–0.95)
CREAT SERPL-MCNC: 0.45 MG/DL (ref 0.51–0.95)
CREAT SERPL-MCNC: 0.48 MG/DL (ref 0.51–0.95)
CREAT SERPL-MCNC: 0.51 MG/DL (ref 0.51–0.95)
CREAT SERPL-MCNC: 0.53 MG/DL (ref 0.51–0.95)
CREAT SERPL-MCNC: 0.55 MG/DL (ref 0.51–0.95)
CREAT SERPL-MCNC: 0.59 MG/DL (ref 0.51–0.95)
DEPRECATED HCO3 PLAS-SCNC: 17 MMOL/L (ref 22–29)
DEPRECATED HCO3 PLAS-SCNC: 21 MMOL/L (ref 22–29)
DEPRECATED HCO3 PLAS-SCNC: 23 MMOL/L (ref 22–29)
DEPRECATED HCO3 PLAS-SCNC: 24 MMOL/L (ref 22–29)
DEPRECATED HCO3 PLAS-SCNC: 26 MMOL/L (ref 22–29)
DEPRECATED HCO3 PLAS-SCNC: 26 MMOL/L (ref 22–29)
DEPRECATED HCO3 PLAS-SCNC: 27 MMOL/L (ref 22–29)
DIASTOLIC BLOOD PRESSURE - MUSE: NORMAL MMHG
DIASTOLIC BLOOD PRESSURE - MUSE: NORMAL MMHG
EOSINOPHIL # BLD AUTO: 0 10E3/UL (ref 0–0.7)
EOSINOPHIL # BLD AUTO: 0 10E3/UL (ref 0–0.7)
EOSINOPHIL NFR BLD AUTO: 0 %
EOSINOPHIL NFR BLD AUTO: 0 %
ERYTHROCYTE [DISTWIDTH] IN BLOOD BY AUTOMATED COUNT: 17.2 % (ref 10–15)
ERYTHROCYTE [DISTWIDTH] IN BLOOD BY AUTOMATED COUNT: 17.3 % (ref 10–15)
ERYTHROCYTE [DISTWIDTH] IN BLOOD BY AUTOMATED COUNT: 17.4 % (ref 10–15)
ERYTHROCYTE [DISTWIDTH] IN BLOOD BY AUTOMATED COUNT: 17.6 % (ref 10–15)
ERYTHROCYTE [DISTWIDTH] IN BLOOD BY AUTOMATED COUNT: 17.8 % (ref 10–15)
ERYTHROCYTE [DISTWIDTH] IN BLOOD BY AUTOMATED COUNT: 18.2 % (ref 10–15)
ERYTHROCYTE [DISTWIDTH] IN BLOOD BY AUTOMATED COUNT: 18.3 % (ref 10–15)
GFR SERPL CREATININE-BSD FRML MDRD: >90 ML/MIN/1.73M2
GLUCOSE BLDC GLUCOMTR-MCNC: 112 MG/DL (ref 70–99)
GLUCOSE BLDC GLUCOMTR-MCNC: 128 MG/DL (ref 70–99)
GLUCOSE BLDC GLUCOMTR-MCNC: 93 MG/DL (ref 70–99)
GLUCOSE SERPL-MCNC: 104 MG/DL (ref 70–99)
GLUCOSE SERPL-MCNC: 123 MG/DL (ref 70–99)
GLUCOSE SERPL-MCNC: 132 MG/DL (ref 70–99)
GLUCOSE SERPL-MCNC: 136 MG/DL (ref 70–99)
GLUCOSE SERPL-MCNC: 77 MG/DL (ref 70–99)
GLUCOSE SERPL-MCNC: 98 MG/DL (ref 70–99)
GLUCOSE SERPL-MCNC: 99 MG/DL (ref 70–99)
GRAM STAIN RESULT: NORMAL
HCT VFR BLD AUTO: 27.6 % (ref 35–47)
HCT VFR BLD AUTO: 29.2 % (ref 35–47)
HCT VFR BLD AUTO: 29.4 % (ref 35–47)
HCT VFR BLD AUTO: 30.9 % (ref 35–47)
HCT VFR BLD AUTO: 31.2 % (ref 35–47)
HCT VFR BLD AUTO: 33.4 % (ref 35–47)
HCT VFR BLD AUTO: 35.6 % (ref 35–47)
HGB BLD-MCNC: 10.2 G/DL (ref 11.7–15.7)
HGB BLD-MCNC: 10.6 G/DL (ref 11.7–15.7)
HGB BLD-MCNC: 8.1 G/DL (ref 11.7–15.7)
HGB BLD-MCNC: 8.4 G/DL (ref 11.7–15.7)
HGB BLD-MCNC: 8.5 G/DL (ref 11.7–15.7)
HGB BLD-MCNC: 9.1 G/DL (ref 11.7–15.7)
HGB BLD-MCNC: 9.2 G/DL (ref 11.7–15.7)
IMM GRANULOCYTES # BLD: 0.1 10E3/UL
IMM GRANULOCYTES # BLD: 0.2 10E3/UL
IMM GRANULOCYTES NFR BLD: 1 %
IMM GRANULOCYTES NFR BLD: 1 %
INR PPP: 1.18 (ref 0.85–1.15)
INR PPP: 1.45 (ref 0.85–1.15)
INTERPRETATION ECG - MUSE: NORMAL
INTERPRETATION ECG - MUSE: NORMAL
LACTATE SERPL-SCNC: 0.6 MMOL/L (ref 0.7–2)
LACTATE SERPL-SCNC: 0.8 MMOL/L (ref 0.7–2)
LACTATE SERPL-SCNC: 1 MMOL/L (ref 0.7–2)
LACTATE SERPL-SCNC: 1.4 MMOL/L (ref 0.7–2)
LACTATE SERPL-SCNC: 1.5 MMOL/L (ref 0.7–2)
LIPASE SERPL-CCNC: 17 U/L (ref 13–60)
LIPASE SERPL-CCNC: 8 U/L (ref 13–60)
LYMPHOCYTES # BLD AUTO: 0.9 10E3/UL (ref 0.8–5.3)
LYMPHOCYTES # BLD AUTO: 1.4 10E3/UL (ref 0.8–5.3)
LYMPHOCYTES NFR BLD AUTO: 11 %
LYMPHOCYTES NFR BLD AUTO: 8 %
MAGNESIUM SERPL-MCNC: 1.4 MG/DL (ref 1.7–2.3)
MAGNESIUM SERPL-MCNC: 1.5 MG/DL (ref 1.7–2.3)
MAGNESIUM SERPL-MCNC: 1.6 MG/DL (ref 1.7–2.3)
MAGNESIUM SERPL-MCNC: 1.6 MG/DL (ref 1.7–2.3)
MAGNESIUM SERPL-MCNC: 1.7 MG/DL (ref 1.7–2.3)
MAGNESIUM SERPL-MCNC: 1.8 MG/DL (ref 1.7–2.3)
MAGNESIUM SERPL-MCNC: 1.8 MG/DL (ref 1.7–2.3)
MAGNESIUM SERPL-MCNC: 1.9 MG/DL (ref 1.7–2.3)
MAGNESIUM SERPL-MCNC: 2 MG/DL (ref 1.7–2.3)
MAGNESIUM SERPL-MCNC: 2.2 MG/DL (ref 1.7–2.3)
MAGNESIUM SERPL-MCNC: 2.3 MG/DL (ref 1.7–2.3)
MCH RBC QN AUTO: 25.4 PG (ref 26.5–33)
MCH RBC QN AUTO: 25.6 PG (ref 26.5–33)
MCH RBC QN AUTO: 25.7 PG (ref 26.5–33)
MCH RBC QN AUTO: 25.7 PG (ref 26.5–33)
MCH RBC QN AUTO: 25.9 PG (ref 26.5–33)
MCH RBC QN AUTO: 26.1 PG (ref 26.5–33)
MCH RBC QN AUTO: 26.5 PG (ref 26.5–33)
MCHC RBC AUTO-ENTMCNC: 28.6 G/DL (ref 31.5–36.5)
MCHC RBC AUTO-ENTMCNC: 29.1 G/DL (ref 31.5–36.5)
MCHC RBC AUTO-ENTMCNC: 29.3 G/DL (ref 31.5–36.5)
MCHC RBC AUTO-ENTMCNC: 29.4 G/DL (ref 31.5–36.5)
MCHC RBC AUTO-ENTMCNC: 29.5 G/DL (ref 31.5–36.5)
MCHC RBC AUTO-ENTMCNC: 29.8 G/DL (ref 31.5–36.5)
MCHC RBC AUTO-ENTMCNC: 30.5 G/DL (ref 31.5–36.5)
MCV RBC AUTO: 86 FL (ref 78–100)
MCV RBC AUTO: 87 FL (ref 78–100)
MCV RBC AUTO: 88 FL (ref 78–100)
MCV RBC AUTO: 91 FL (ref 78–100)
MONOCYTES # BLD AUTO: 0.9 10E3/UL (ref 0–1.3)
MONOCYTES # BLD AUTO: 1.6 10E3/UL (ref 0–1.3)
MONOCYTES NFR BLD AUTO: 13 %
MONOCYTES NFR BLD AUTO: 8 %
MRSA DNA SPEC QL NAA+PROBE: NEGATIVE
NEUTROPHILS # BLD AUTO: 9.4 10E3/UL (ref 1.6–8.3)
NEUTROPHILS # BLD AUTO: 9.4 10E3/UL (ref 1.6–8.3)
NEUTROPHILS NFR BLD AUTO: 75 %
NEUTROPHILS NFR BLD AUTO: 83 %
NRBC # BLD AUTO: 0 10E3/UL
NRBC # BLD AUTO: 0 10E3/UL
NRBC BLD AUTO-RTO: 0 /100
NRBC BLD AUTO-RTO: 0 /100
P AXIS - MUSE: 52 DEGREES
P AXIS - MUSE: 54 DEGREES
PHOSPHATE SERPL-MCNC: 1.8 MG/DL (ref 2.5–4.5)
PHOSPHATE SERPL-MCNC: 2.3 MG/DL (ref 2.5–4.5)
PHOSPHATE SERPL-MCNC: 2.6 MG/DL (ref 2.5–4.5)
PHOSPHATE SERPL-MCNC: 2.8 MG/DL (ref 2.5–4.5)
PHOSPHATE SERPL-MCNC: 2.8 MG/DL (ref 2.5–4.5)
PHOSPHATE SERPL-MCNC: 2.9 MG/DL (ref 2.5–4.5)
PHOSPHATE SERPL-MCNC: 3.3 MG/DL (ref 2.5–4.5)
PHOSPHATE SERPL-MCNC: 3.6 MG/DL (ref 2.5–4.5)
PHOSPHATE SERPL-MCNC: 4 MG/DL (ref 2.5–4.5)
PHOSPHATE SERPL-MCNC: 4.2 MG/DL (ref 2.5–4.5)
PHOSPHATE SERPL-MCNC: 4.8 MG/DL (ref 2.5–4.5)
PLATELET # BLD AUTO: 174 10E3/UL (ref 150–450)
PLATELET # BLD AUTO: 174 10E3/UL (ref 150–450)
PLATELET # BLD AUTO: 195 10E3/UL (ref 150–450)
PLATELET # BLD AUTO: 211 10E3/UL (ref 150–450)
PLATELET # BLD AUTO: 250 10E3/UL (ref 150–450)
PLATELET # BLD AUTO: 326 10E3/UL (ref 150–450)
PLATELET # BLD AUTO: 495 10E3/UL (ref 150–450)
PLATELET # BLD AUTO: 544 10E3/UL (ref 150–450)
POTASSIUM SERPL-SCNC: 2.5 MMOL/L (ref 3.4–5.3)
POTASSIUM SERPL-SCNC: 3.2 MMOL/L (ref 3.4–5.3)
POTASSIUM SERPL-SCNC: 3.5 MMOL/L (ref 3.4–5.3)
POTASSIUM SERPL-SCNC: 3.5 MMOL/L (ref 3.4–5.3)
POTASSIUM SERPL-SCNC: 3.7 MMOL/L (ref 3.4–5.3)
POTASSIUM SERPL-SCNC: 3.7 MMOL/L (ref 3.4–5.3)
POTASSIUM SERPL-SCNC: 3.8 MMOL/L (ref 3.4–5.3)
POTASSIUM SERPL-SCNC: 3.8 MMOL/L (ref 3.4–5.3)
POTASSIUM SERPL-SCNC: 3.9 MMOL/L (ref 3.4–5.3)
POTASSIUM SERPL-SCNC: 4 MMOL/L (ref 3.4–5.3)
POTASSIUM SERPL-SCNC: 4 MMOL/L (ref 3.4–5.3)
POTASSIUM SERPL-SCNC: 4.3 MMOL/L (ref 3.4–5.3)
PR INTERVAL - MUSE: 152 MS
PR INTERVAL - MUSE: 156 MS
PROCALCITONIN SERPL IA-MCNC: 0.18 NG/ML
PROCALCITONIN SERPL IA-MCNC: 2.65 NG/ML
PROT SERPL-MCNC: 5.4 G/DL (ref 6.4–8.3)
PROT SERPL-MCNC: 5.6 G/DL (ref 6.4–8.3)
PROT SERPL-MCNC: 5.8 G/DL (ref 6.4–8.3)
PROT SERPL-MCNC: 5.9 G/DL (ref 6.4–8.3)
PROT SERPL-MCNC: 5.9 G/DL (ref 6.4–8.3)
QRS DURATION - MUSE: 60 MS
QRS DURATION - MUSE: 64 MS
QT - MUSE: 320 MS
QT - MUSE: 350 MS
QTC - MUSE: 446 MS
QTC - MUSE: 451 MS
R AXIS - MUSE: -19 DEGREES
R AXIS - MUSE: 20 DEGREES
RBC # BLD AUTO: 3.13 10E6/UL (ref 3.8–5.2)
RBC # BLD AUTO: 3.22 10E6/UL (ref 3.8–5.2)
RBC # BLD AUTO: 3.35 10E6/UL (ref 3.8–5.2)
RBC # BLD AUTO: 3.54 10E6/UL (ref 3.8–5.2)
RBC # BLD AUTO: 3.59 10E6/UL (ref 3.8–5.2)
RBC # BLD AUTO: 3.85 10E6/UL (ref 3.8–5.2)
RBC # BLD AUTO: 4.12 10E6/UL (ref 3.8–5.2)
SA TARGET DNA: NEGATIVE
SODIUM SERPL-SCNC: 134 MMOL/L (ref 136–145)
SODIUM SERPL-SCNC: 134 MMOL/L (ref 136–145)
SODIUM SERPL-SCNC: 135 MMOL/L (ref 136–145)
SODIUM SERPL-SCNC: 136 MMOL/L (ref 136–145)
SODIUM SERPL-SCNC: 136 MMOL/L (ref 136–145)
SODIUM SERPL-SCNC: 137 MMOL/L (ref 136–145)
SODIUM SERPL-SCNC: 140 MMOL/L (ref 136–145)
SPECIMEN EXPIRATION DATE: NORMAL
SYSTOLIC BLOOD PRESSURE - MUSE: NORMAL MMHG
SYSTOLIC BLOOD PRESSURE - MUSE: NORMAL MMHG
T AXIS - MUSE: 15 DEGREES
T AXIS - MUSE: 37 DEGREES
TROPONIN T SERPL HS-MCNC: 13 NG/L
TROPONIN T SERPL HS-MCNC: 13 NG/L
VENTRICULAR RATE- MUSE: 100 BPM
VENTRICULAR RATE- MUSE: 117 BPM
WBC # BLD AUTO: 11.3 10E3/UL (ref 4–11)
WBC # BLD AUTO: 12.7 10E3/UL (ref 4–11)
WBC # BLD AUTO: 13.7 10E3/UL (ref 4–11)
WBC # BLD AUTO: 24.6 10E3/UL (ref 4–11)
WBC # BLD AUTO: 5.3 10E3/UL (ref 4–11)
WBC # BLD AUTO: 5.6 10E3/UL (ref 4–11)
WBC # BLD AUTO: 7.7 10E3/UL (ref 4–11)

## 2023-01-01 PROCEDURE — 250N000013 HC RX MED GY IP 250 OP 250 PS 637: Performed by: SURGERY

## 2023-01-01 PROCEDURE — 250N000013 HC RX MED GY IP 250 OP 250 PS 637: Performed by: INTERNAL MEDICINE

## 2023-01-01 PROCEDURE — 120N000013 HC R&B IMCU

## 2023-01-01 PROCEDURE — 250N000013 HC RX MED GY IP 250 OP 250 PS 637: Performed by: PHYSICIAN ASSISTANT

## 2023-01-01 PROCEDURE — 84100 ASSAY OF PHOSPHORUS: CPT | Performed by: HOSPITALIST

## 2023-01-01 PROCEDURE — 87070 CULTURE OTHR SPECIMN AEROBIC: CPT | Performed by: PHYSICIAN ASSISTANT

## 2023-01-01 PROCEDURE — 83605 ASSAY OF LACTIC ACID: CPT | Performed by: INTERNAL MEDICINE

## 2023-01-01 PROCEDURE — 85027 COMPLETE CBC AUTOMATED: CPT | Performed by: HOSPITALIST

## 2023-01-01 PROCEDURE — 36415 COLL VENOUS BLD VENIPUNCTURE: CPT | Performed by: INTERNAL MEDICINE

## 2023-01-01 PROCEDURE — 250N000011 HC RX IP 250 OP 636: Mod: JZ | Performed by: PHYSICIAN ASSISTANT

## 2023-01-01 PROCEDURE — 83735 ASSAY OF MAGNESIUM: CPT | Performed by: HOSPITALIST

## 2023-01-01 PROCEDURE — 93010 ELECTROCARDIOGRAM REPORT: CPT | Performed by: INTERNAL MEDICINE

## 2023-01-01 PROCEDURE — 84100 ASSAY OF PHOSPHORUS: CPT | Performed by: INTERNAL MEDICINE

## 2023-01-01 PROCEDURE — 200N000001 HC R&B ICU

## 2023-01-01 PROCEDURE — 36415 COLL VENOUS BLD VENIPUNCTURE: CPT | Performed by: HOSPITALIST

## 2023-01-01 PROCEDURE — 250N000011 HC RX IP 250 OP 636: Performed by: PHYSICIAN ASSISTANT

## 2023-01-01 PROCEDURE — 84100 ASSAY OF PHOSPHORUS: CPT | Performed by: PHYSICIAN ASSISTANT

## 2023-01-01 PROCEDURE — 250N000011 HC RX IP 250 OP 636: Performed by: INTERNAL MEDICINE

## 2023-01-01 PROCEDURE — 83605 ASSAY OF LACTIC ACID: CPT | Performed by: HOSPITALIST

## 2023-01-01 PROCEDURE — 99233 SBSQ HOSP IP/OBS HIGH 50: CPT | Performed by: INTERNAL MEDICINE

## 2023-01-01 PROCEDURE — 80048 BASIC METABOLIC PNL TOTAL CA: CPT | Performed by: HOSPITALIST

## 2023-01-01 PROCEDURE — 250N000009 HC RX 250: Performed by: INTERNAL MEDICINE

## 2023-01-01 PROCEDURE — 86850 RBC ANTIBODY SCREEN: CPT | Performed by: INTERNAL MEDICINE

## 2023-01-01 PROCEDURE — 250N000011 HC RX IP 250 OP 636: Mod: JZ | Performed by: INTERNAL MEDICINE

## 2023-01-01 PROCEDURE — 82248 BILIRUBIN DIRECT: CPT | Performed by: INTERNAL MEDICINE

## 2023-01-01 PROCEDURE — 83735 ASSAY OF MAGNESIUM: CPT | Performed by: INTERNAL MEDICINE

## 2023-01-01 PROCEDURE — G0463 HOSPITAL OUTPT CLINIC VISIT: HCPCS

## 2023-01-01 PROCEDURE — 97530 THERAPEUTIC ACTIVITIES: CPT | Mod: GP | Performed by: PHYSICAL THERAPIST

## 2023-01-01 PROCEDURE — 258N000003 HC RX IP 258 OP 636: Performed by: INTERNAL MEDICINE

## 2023-01-01 PROCEDURE — 99233 SBSQ HOSP IP/OBS HIGH 50: CPT | Performed by: HOSPITALIST

## 2023-01-01 PROCEDURE — 84145 PROCALCITONIN (PCT): CPT | Performed by: INTERNAL MEDICINE

## 2023-01-01 PROCEDURE — 250N000013 HC RX MED GY IP 250 OP 250 PS 637: Performed by: HOSPITALIST

## 2023-01-01 PROCEDURE — 85027 COMPLETE CBC AUTOMATED: CPT | Performed by: INTERNAL MEDICINE

## 2023-01-01 PROCEDURE — 84132 ASSAY OF SERUM POTASSIUM: CPT | Performed by: INTERNAL MEDICINE

## 2023-01-01 PROCEDURE — 97530 THERAPEUTIC ACTIVITIES: CPT | Mod: GP

## 2023-01-01 PROCEDURE — 94640 AIRWAY INHALATION TREATMENT: CPT

## 2023-01-01 PROCEDURE — 258N000003 HC RX IP 258 OP 636: Performed by: PHYSICIAN ASSISTANT

## 2023-01-01 PROCEDURE — 87205 SMEAR GRAM STAIN: CPT | Performed by: INTERNAL MEDICINE

## 2023-01-01 PROCEDURE — 3E043XZ INTRODUCTION OF VASOPRESSOR INTO CENTRAL VEIN, PERCUTANEOUS APPROACH: ICD-10-PCS | Performed by: INTERNAL MEDICINE

## 2023-01-01 PROCEDURE — 85610 PROTHROMBIN TIME: CPT | Performed by: INTERNAL MEDICINE

## 2023-01-01 PROCEDURE — 82150 ASSAY OF AMYLASE: CPT | Performed by: INTERNAL MEDICINE

## 2023-01-01 PROCEDURE — 93005 ELECTROCARDIOGRAM TRACING: CPT

## 2023-01-01 PROCEDURE — 83735 ASSAY OF MAGNESIUM: CPT | Performed by: PHYSICIAN ASSISTANT

## 2023-01-01 PROCEDURE — 87641 MR-STAPH DNA AMP PROBE: CPT | Performed by: INTERNAL MEDICINE

## 2023-01-01 PROCEDURE — 250N000011 HC RX IP 250 OP 636: Performed by: NURSE PRACTITIONER

## 2023-01-01 PROCEDURE — 80053 COMPREHEN METABOLIC PANEL: CPT | Performed by: INTERNAL MEDICINE

## 2023-01-01 PROCEDURE — 250N000011 HC RX IP 250 OP 636: Mod: JZ | Performed by: HOSPITALIST

## 2023-01-01 PROCEDURE — 99221 1ST HOSP IP/OBS SF/LOW 40: CPT | Performed by: PHYSICIAN ASSISTANT

## 2023-01-01 PROCEDURE — 85027 COMPLETE CBC AUTOMATED: CPT | Performed by: PHYSICIAN ASSISTANT

## 2023-01-01 PROCEDURE — 71045 X-RAY EXAM CHEST 1 VIEW: CPT

## 2023-01-01 PROCEDURE — 99232 SBSQ HOSP IP/OBS MODERATE 35: CPT | Performed by: HOSPITALIST

## 2023-01-01 PROCEDURE — 999N000040 HC STATISTIC CONSULT NO CHARGE VASC ACCESS

## 2023-01-01 PROCEDURE — 36415 COLL VENOUS BLD VENIPUNCTURE: CPT | Performed by: PHYSICIAN ASSISTANT

## 2023-01-01 PROCEDURE — 99222 1ST HOSP IP/OBS MODERATE 55: CPT

## 2023-01-01 PROCEDURE — 97116 GAIT TRAINING THERAPY: CPT | Mod: GP

## 2023-01-01 PROCEDURE — 120N000001 HC R&B MED SURG/OB

## 2023-01-01 PROCEDURE — 99238 HOSP IP/OBS DSCHRG MGMT 30/<: CPT | Performed by: INTERNAL MEDICINE

## 2023-01-01 PROCEDURE — 82962 GLUCOSE BLOOD TEST: CPT

## 2023-01-01 PROCEDURE — 82565 ASSAY OF CREATININE: CPT | Performed by: INTERNAL MEDICINE

## 2023-01-01 PROCEDURE — 99232 SBSQ HOSP IP/OBS MODERATE 35: CPT | Performed by: SURGERY

## 2023-01-01 PROCEDURE — 80053 COMPREHEN METABOLIC PANEL: CPT | Performed by: HOSPITALIST

## 2023-01-01 PROCEDURE — 84132 ASSAY OF SERUM POTASSIUM: CPT | Performed by: HOSPITALIST

## 2023-01-01 PROCEDURE — 97161 PT EVAL LOW COMPLEX 20 MIN: CPT | Mod: GP

## 2023-01-01 PROCEDURE — 85025 COMPLETE CBC W/AUTO DIFF WBC: CPT | Performed by: INTERNAL MEDICINE

## 2023-01-01 PROCEDURE — 84100 ASSAY OF PHOSPHORUS: CPT | Performed by: SURGERY

## 2023-01-01 PROCEDURE — 99222 1ST HOSP IP/OBS MODERATE 55: CPT | Performed by: NURSE PRACTITIONER

## 2023-01-01 PROCEDURE — 83690 ASSAY OF LIPASE: CPT | Performed by: INTERNAL MEDICINE

## 2023-01-01 PROCEDURE — 74176 CT ABD & PELVIS W/O CONTRAST: CPT

## 2023-01-01 PROCEDURE — 272N000433 HC KIT CATH IV 18 OR 20G CM, POWERGLIDE W MAX BARRIER

## 2023-01-01 PROCEDURE — 84484 ASSAY OF TROPONIN QUANT: CPT | Performed by: HOSPITALIST

## 2023-01-01 PROCEDURE — 99232 SBSQ HOSP IP/OBS MODERATE 35: CPT | Performed by: INTERNAL MEDICINE

## 2023-01-01 PROCEDURE — 99291 CRITICAL CARE FIRST HOUR: CPT | Performed by: INTERNAL MEDICINE

## 2023-01-01 PROCEDURE — 83690 ASSAY OF LIPASE: CPT | Performed by: HOSPITALIST

## 2023-01-01 PROCEDURE — 82330 ASSAY OF CALCIUM: CPT | Performed by: INTERNAL MEDICINE

## 2023-01-01 PROCEDURE — 82248 BILIRUBIN DIRECT: CPT | Performed by: PHYSICIAN ASSISTANT

## 2023-01-01 PROCEDURE — 87040 BLOOD CULTURE FOR BACTERIA: CPT | Performed by: INTERNAL MEDICINE

## 2023-01-01 PROCEDURE — 84145 PROCALCITONIN (PCT): CPT | Performed by: HOSPITALIST

## 2023-01-01 PROCEDURE — 36569 INSJ PICC 5 YR+ W/O IMAGING: CPT

## 2023-01-01 PROCEDURE — 85049 AUTOMATED PLATELET COUNT: CPT | Performed by: INTERNAL MEDICINE

## 2023-01-01 PROCEDURE — 83605 ASSAY OF LACTIC ACID: CPT | Performed by: PHYSICIAN ASSISTANT

## 2023-01-01 PROCEDURE — 97116 GAIT TRAINING THERAPY: CPT | Mod: GP | Performed by: PHYSICAL THERAPIST

## 2023-01-01 RX ORDER — PANTOPRAZOLE SODIUM 40 MG/1
40 TABLET, DELAYED RELEASE ORAL
Status: DISCONTINUED | OUTPATIENT
Start: 2023-01-01 | End: 2023-01-01 | Stop reason: HOSPADM

## 2023-01-01 RX ORDER — SODIUM CHLORIDE 9 MG/ML
INJECTION, SOLUTION INTRAVENOUS CONTINUOUS
Status: DISCONTINUED | OUTPATIENT
Start: 2023-01-01 | End: 2023-01-01

## 2023-01-01 RX ORDER — PIPERACILLIN SODIUM, TAZOBACTAM SODIUM 3; .375 G/15ML; G/15ML
3.38 INJECTION, POWDER, LYOPHILIZED, FOR SOLUTION INTRAVENOUS EVERY 6 HOURS
Status: DISPENSED | OUTPATIENT
Start: 2023-01-01 | End: 2023-01-01

## 2023-01-01 RX ORDER — MULTIPLE VITAMINS W/ MINERALS TAB 9MG-400MCG
1 TAB ORAL DAILY
Status: DISCONTINUED | OUTPATIENT
Start: 2023-01-01 | End: 2023-01-01 | Stop reason: HOSPADM

## 2023-01-01 RX ORDER — SODIUM CHLORIDE, SODIUM LACTATE, POTASSIUM CHLORIDE, CALCIUM CHLORIDE 600; 310; 30; 20 MG/100ML; MG/100ML; MG/100ML; MG/100ML
INJECTION, SOLUTION INTRAVENOUS CONTINUOUS
Status: DISCONTINUED | OUTPATIENT
Start: 2023-01-01 | End: 2023-01-01

## 2023-01-01 RX ORDER — NYSTATIN 100000 U/G
CREAM TOPICAL 2 TIMES DAILY
Status: DISCONTINUED | OUTPATIENT
Start: 2023-01-01 | End: 2023-01-01 | Stop reason: HOSPADM

## 2023-01-01 RX ORDER — ACETAMINOPHEN 325 MG/1
650 TABLET ORAL EVERY 8 HOURS PRN
COMMUNITY
Start: 2023-01-01

## 2023-01-01 RX ORDER — POTASSIUM CHLORIDE 7.45 MG/ML
10 INJECTION INTRAVENOUS
Status: COMPLETED | OUTPATIENT
Start: 2023-01-01 | End: 2023-01-01

## 2023-01-01 RX ORDER — POTASSIUM CHLORIDE 1500 MG/1
20 TABLET, EXTENDED RELEASE ORAL ONCE
Status: COMPLETED | OUTPATIENT
Start: 2023-01-01 | End: 2023-01-01

## 2023-01-01 RX ORDER — NALOXONE HYDROCHLORIDE 0.4 MG/ML
0.4 INJECTION, SOLUTION INTRAMUSCULAR; INTRAVENOUS; SUBCUTANEOUS
Status: DISCONTINUED | OUTPATIENT
Start: 2023-01-01 | End: 2023-01-01 | Stop reason: HOSPADM

## 2023-01-01 RX ORDER — MORPHINE SULFATE 15 MG/1
15 TABLET ORAL EVERY 6 HOURS PRN
Refills: 0 | COMMUNITY
Start: 2023-01-01

## 2023-01-01 RX ORDER — SUCRALFATE 1 G/1
1 TABLET ORAL 4 TIMES DAILY
Status: DISCONTINUED | OUTPATIENT
Start: 2023-01-01 | End: 2023-01-01 | Stop reason: HOSPADM

## 2023-01-01 RX ORDER — POTASSIUM CHLORIDE 1.5 G/1.58G
20 POWDER, FOR SOLUTION ORAL ONCE
Status: COMPLETED | OUTPATIENT
Start: 2023-01-01 | End: 2023-01-01

## 2023-01-01 RX ORDER — MORPHINE SULFATE 15 MG/1
15 TABLET ORAL EVERY 4 HOURS PRN
Status: DISCONTINUED | OUTPATIENT
Start: 2023-01-01 | End: 2023-01-01 | Stop reason: HOSPADM

## 2023-01-01 RX ORDER — ROPIVACAINE IN 0.9% SOD CHL/PF 0.1 %
.03-.125 PLASTIC BAG, INJECTION (ML) EPIDURAL CONTINUOUS
Status: DISCONTINUED | OUTPATIENT
Start: 2023-01-01 | End: 2023-01-01

## 2023-01-01 RX ORDER — MORPHINE SULFATE 15 MG/1
15 TABLET ORAL EVERY 4 HOURS PRN
Status: DISCONTINUED | OUTPATIENT
Start: 2023-01-01 | End: 2023-01-01

## 2023-01-01 RX ORDER — DOXYCYCLINE 100 MG/10ML
100 INJECTION, POWDER, LYOPHILIZED, FOR SOLUTION INTRAVENOUS EVERY 12 HOURS
Status: COMPLETED | OUTPATIENT
Start: 2023-01-01 | End: 2023-01-01

## 2023-01-01 RX ORDER — ACETAMINOPHEN 325 MG/1
650 TABLET ORAL EVERY 4 HOURS PRN
Status: DISCONTINUED | OUTPATIENT
Start: 2023-01-01 | End: 2023-01-01

## 2023-01-01 RX ORDER — HYDRALAZINE HYDROCHLORIDE 20 MG/ML
10 INJECTION INTRAMUSCULAR; INTRAVENOUS EVERY 4 HOURS PRN
Status: DISCONTINUED | OUTPATIENT
Start: 2023-01-01 | End: 2023-01-01 | Stop reason: HOSPADM

## 2023-01-01 RX ORDER — DEXTROSE MONOHYDRATE 25 G/50ML
25-50 INJECTION, SOLUTION INTRAVENOUS
Status: DISCONTINUED | OUTPATIENT
Start: 2023-01-01 | End: 2023-01-01 | Stop reason: HOSPADM

## 2023-01-01 RX ORDER — NALOXONE HYDROCHLORIDE 0.4 MG/ML
0.2 INJECTION, SOLUTION INTRAMUSCULAR; INTRAVENOUS; SUBCUTANEOUS
Status: DISCONTINUED | OUTPATIENT
Start: 2023-01-01 | End: 2023-01-01 | Stop reason: HOSPADM

## 2023-01-01 RX ORDER — METOPROLOL TARTRATE 50 MG
50 TABLET ORAL 2 TIMES DAILY
Status: DISCONTINUED | OUTPATIENT
Start: 2023-01-01 | End: 2023-01-01 | Stop reason: HOSPADM

## 2023-01-01 RX ORDER — MAGNESIUM SULFATE HEPTAHYDRATE 40 MG/ML
4 INJECTION, SOLUTION INTRAVENOUS ONCE
Status: COMPLETED | OUTPATIENT
Start: 2023-01-01 | End: 2023-01-01

## 2023-01-01 RX ORDER — GUAIFENESIN 600 MG/1
600 TABLET, EXTENDED RELEASE ORAL 2 TIMES DAILY
Status: DISCONTINUED | OUTPATIENT
Start: 2023-01-01 | End: 2023-01-01 | Stop reason: HOSPADM

## 2023-01-01 RX ORDER — LIDOCAINE 40 MG/G
CREAM TOPICAL
Status: DISCONTINUED | OUTPATIENT
Start: 2023-01-01 | End: 2023-01-01 | Stop reason: HOSPADM

## 2023-01-01 RX ORDER — ALBUTEROL SULFATE 90 UG/1
1-2 AEROSOL, METERED RESPIRATORY (INHALATION)
COMMUNITY

## 2023-01-01 RX ORDER — NOREPINEPHRINE BITARTRATE 0.02 MG/ML
.01-.6 INJECTION, SOLUTION INTRAVENOUS CONTINUOUS
Status: DISCONTINUED | OUTPATIENT
Start: 2023-01-01 | End: 2023-01-01

## 2023-01-01 RX ORDER — LIDOCAINE 50 MG/G
PATCH TOPICAL EVERY 24 HOURS
COMMUNITY

## 2023-01-01 RX ORDER — DOXYCYCLINE 100 MG/10ML
100 INJECTION, POWDER, LYOPHILIZED, FOR SOLUTION INTRAVENOUS EVERY 12 HOURS
Status: DISCONTINUED | OUTPATIENT
Start: 2023-01-01 | End: 2023-01-01

## 2023-01-01 RX ORDER — NITROGLYCERIN 0.4 MG/1
0.4 TABLET SUBLINGUAL EVERY 5 MIN PRN
Status: DISCONTINUED | OUTPATIENT
Start: 2023-01-01 | End: 2023-01-01 | Stop reason: HOSPADM

## 2023-01-01 RX ORDER — CALCIUM GLUCONATE 20 MG/ML
1 INJECTION, SOLUTION INTRAVENOUS ONCE
Status: COMPLETED | OUTPATIENT
Start: 2023-01-01 | End: 2023-01-01

## 2023-01-01 RX ORDER — MULTIVITAMIN,THERAPEUTIC
1 TABLET ORAL DAILY
Status: DISCONTINUED | OUTPATIENT
Start: 2023-01-01 | End: 2023-01-01

## 2023-01-01 RX ORDER — SUCRALFATE 1 G/1
1 TABLET ORAL 4 TIMES DAILY
COMMUNITY

## 2023-01-01 RX ORDER — HYDROMORPHONE HYDROCHLORIDE 1 MG/ML
.5-1 INJECTION, SOLUTION INTRAMUSCULAR; INTRAVENOUS; SUBCUTANEOUS
Status: DISCONTINUED | OUTPATIENT
Start: 2023-01-01 | End: 2023-01-01

## 2023-01-01 RX ORDER — TIOTROPIUM BROMIDE 18 UG/1
18 CAPSULE ORAL; RESPIRATORY (INHALATION) DAILY
COMMUNITY

## 2023-01-01 RX ORDER — ACETAMINOPHEN 650 MG/1
650 SUPPOSITORY RECTAL EVERY 4 HOURS PRN
Status: DISCONTINUED | OUTPATIENT
Start: 2023-01-01 | End: 2023-01-01

## 2023-01-01 RX ORDER — NITROGLYCERIN 0.4 MG/1
0.4 TABLET SUBLINGUAL EVERY 5 MIN PRN
Status: DISCONTINUED | OUTPATIENT
Start: 2023-01-01 | End: 2023-01-01

## 2023-01-01 RX ORDER — HYDROMORPHONE HYDROCHLORIDE 1 MG/ML
.3-.5 INJECTION, SOLUTION INTRAMUSCULAR; INTRAVENOUS; SUBCUTANEOUS
Status: DISCONTINUED | OUTPATIENT
Start: 2023-01-01 | End: 2023-01-01

## 2023-01-01 RX ORDER — NICOTINE POLACRILEX 4 MG
15-30 LOZENGE BUCCAL
Status: DISCONTINUED | OUTPATIENT
Start: 2023-01-01 | End: 2023-01-01 | Stop reason: HOSPADM

## 2023-01-01 RX ORDER — VANCOMYCIN HYDROCHLORIDE 1 G/200ML
1000 INJECTION, SOLUTION INTRAVENOUS EVERY 12 HOURS
Status: DISCONTINUED | OUTPATIENT
Start: 2023-01-01 | End: 2023-01-01

## 2023-01-01 RX ORDER — OXYCODONE HYDROCHLORIDE 10 MG/1
10 TABLET ORAL EVERY 6 HOURS PRN
Status: ON HOLD | COMMUNITY
End: 2023-01-01

## 2023-01-01 RX ORDER — IPRATROPIUM BROMIDE AND ALBUTEROL SULFATE 2.5; .5 MG/3ML; MG/3ML
3 SOLUTION RESPIRATORY (INHALATION) EVERY 4 HOURS PRN
Status: DISCONTINUED | OUTPATIENT
Start: 2023-01-01 | End: 2023-01-01 | Stop reason: HOSPADM

## 2023-01-01 RX ORDER — LIDOCAINE 40 MG/G
CREAM TOPICAL
Status: DISCONTINUED | OUTPATIENT
Start: 2023-01-01 | End: 2023-01-01

## 2023-01-01 RX ORDER — GUAIFENESIN 200 MG/10ML
200 LIQUID ORAL EVERY 4 HOURS PRN
Status: DISCONTINUED | OUTPATIENT
Start: 2023-01-01 | End: 2023-01-01 | Stop reason: HOSPADM

## 2023-01-01 RX ORDER — MORPHINE SULFATE 15 MG/1
15 TABLET ORAL EVERY 4 HOURS PRN
Status: ON HOLD | COMMUNITY
End: 2023-01-01

## 2023-01-01 RX ORDER — GABAPENTIN 300 MG/1
300 CAPSULE ORAL 3 TIMES DAILY
Status: ON HOLD | COMMUNITY
End: 2023-01-01

## 2023-01-01 RX ORDER — HYDROMORPHONE HYDROCHLORIDE 1 MG/ML
0.3 INJECTION, SOLUTION INTRAMUSCULAR; INTRAVENOUS; SUBCUTANEOUS
Status: DISCONTINUED | OUTPATIENT
Start: 2023-01-01 | End: 2023-01-01

## 2023-01-01 RX ORDER — METOPROLOL TARTRATE 50 MG
50 TABLET ORAL 2 TIMES DAILY
COMMUNITY

## 2023-01-01 RX ORDER — GABAPENTIN 300 MG/1
300 CAPSULE ORAL 3 TIMES DAILY
Status: DISCONTINUED | OUTPATIENT
Start: 2023-01-01 | End: 2023-01-01

## 2023-01-01 RX ORDER — FOLIC ACID 1 MG/1
1 TABLET ORAL DAILY
Status: COMPLETED | OUTPATIENT
Start: 2023-01-01 | End: 2023-01-01

## 2023-01-01 RX ORDER — HYDROXYZINE PAMOATE 25 MG/1
50 CAPSULE ORAL AT BEDTIME
COMMUNITY

## 2023-01-01 RX ORDER — HYDROMORPHONE HYDROCHLORIDE 1 MG/ML
0.3 INJECTION, SOLUTION INTRAMUSCULAR; INTRAVENOUS; SUBCUTANEOUS EVERY 6 HOURS PRN
Status: DISCONTINUED | OUTPATIENT
Start: 2023-01-01 | End: 2023-01-01

## 2023-01-01 RX ORDER — LIDOCAINE 4 G/G
1 PATCH TOPICAL EVERY 24 HOURS
Status: DISCONTINUED | OUTPATIENT
Start: 2023-01-01 | End: 2023-01-01 | Stop reason: HOSPADM

## 2023-01-01 RX ORDER — HYDROMORPHONE HYDROCHLORIDE 1 MG/ML
0.5 INJECTION, SOLUTION INTRAMUSCULAR; INTRAVENOUS; SUBCUTANEOUS
Status: DISCONTINUED | OUTPATIENT
Start: 2023-01-01 | End: 2023-01-01

## 2023-01-01 RX ORDER — ALBUTEROL SULFATE 0.83 MG/ML
2.5 SOLUTION RESPIRATORY (INHALATION)
Status: DISCONTINUED | OUTPATIENT
Start: 2023-01-01 | End: 2023-01-01 | Stop reason: HOSPADM

## 2023-01-01 RX ORDER — POTASSIUM CHLORIDE 1500 MG/1
40 TABLET, EXTENDED RELEASE ORAL ONCE
Status: COMPLETED | OUTPATIENT
Start: 2023-01-01 | End: 2023-01-01

## 2023-01-01 RX ORDER — ACETAMINOPHEN 325 MG/1
650 TABLET ORAL EVERY 8 HOURS PRN
Status: DISCONTINUED | OUTPATIENT
Start: 2023-01-01 | End: 2023-01-01 | Stop reason: HOSPADM

## 2023-01-01 RX ORDER — LOPERAMIDE HCL 2 MG
2 CAPSULE ORAL 4 TIMES DAILY PRN
Status: DISCONTINUED | OUTPATIENT
Start: 2023-01-01 | End: 2023-01-01 | Stop reason: HOSPADM

## 2023-01-01 RX ORDER — POTASSIUM CHLORIDE 1500 MG/1
20 TABLET, EXTENDED RELEASE ORAL ONCE
Status: DISCONTINUED | OUTPATIENT
Start: 2023-01-01 | End: 2023-01-01

## 2023-01-01 RX ORDER — ENOXAPARIN SODIUM 100 MG/ML
40 INJECTION SUBCUTANEOUS EVERY 24 HOURS
Status: DISCONTINUED | OUTPATIENT
Start: 2023-01-01 | End: 2023-01-01 | Stop reason: HOSPADM

## 2023-01-01 RX ORDER — DULOXETIN HYDROCHLORIDE 20 MG/1
20 CAPSULE, DELAYED RELEASE ORAL 2 TIMES DAILY
Status: DISCONTINUED | OUTPATIENT
Start: 2023-01-01 | End: 2023-01-01

## 2023-01-01 RX ADMIN — THERA TABS 1 TABLET: TAB at 09:35

## 2023-01-01 RX ADMIN — ENOXAPARIN SODIUM 40 MG: 40 INJECTION SUBCUTANEOUS at 09:15

## 2023-01-01 RX ADMIN — SUCRALFATE 1 G: 1 TABLET ORAL at 22:03

## 2023-01-01 RX ADMIN — ACETAMINOPHEN 650 MG: 325 TABLET, FILM COATED ORAL at 14:49

## 2023-01-01 RX ADMIN — SUCRALFATE 1 G: 1 TABLET ORAL at 22:04

## 2023-01-01 RX ADMIN — MORPHINE SULFATE 15 MG: 15 TABLET ORAL at 13:50

## 2023-01-01 RX ADMIN — PIPERACILLIN AND TAZOBACTAM 3.38 G: 3; .375 INJECTION, POWDER, FOR SOLUTION INTRAVENOUS at 10:29

## 2023-01-01 RX ADMIN — SUCRALFATE 1 G: 1 TABLET ORAL at 07:55

## 2023-01-01 RX ADMIN — HYDROMORPHONE HYDROCHLORIDE 0.5 MG: 1 INJECTION, SOLUTION INTRAMUSCULAR; INTRAVENOUS; SUBCUTANEOUS at 22:32

## 2023-01-01 RX ADMIN — MORPHINE SULFATE 15 MG: 15 TABLET ORAL at 23:13

## 2023-01-01 RX ADMIN — POTASSIUM CHLORIDE 10 MEQ: 7.46 INJECTION, SOLUTION INTRAVENOUS at 05:20

## 2023-01-01 RX ADMIN — SUCRALFATE 1 G: 1 TABLET ORAL at 08:13

## 2023-01-01 RX ADMIN — PIPERACILLIN AND TAZOBACTAM 3.38 G: 3; .375 INJECTION, POWDER, FOR SOLUTION INTRAVENOUS at 22:03

## 2023-01-01 RX ADMIN — PIPERACILLIN AND TAZOBACTAM 3.38 G: 3; .375 INJECTION, POWDER, FOR SOLUTION INTRAVENOUS at 23:43

## 2023-01-01 RX ADMIN — THIAMINE HCL TAB 100 MG 100 MG: 100 TAB at 10:03

## 2023-01-01 RX ADMIN — CALCIUM GLUCONATE 1 G: 20 INJECTION, SOLUTION INTRAVENOUS at 18:46

## 2023-01-01 RX ADMIN — HYDROMORPHONE HYDROCHLORIDE 0.5 MG: 1 INJECTION, SOLUTION INTRAMUSCULAR; INTRAVENOUS; SUBCUTANEOUS at 20:07

## 2023-01-01 RX ADMIN — PIPERACILLIN AND TAZOBACTAM 3.38 G: 3; .375 INJECTION, POWDER, FOR SOLUTION INTRAVENOUS at 16:03

## 2023-01-01 RX ADMIN — DOXYCYCLINE 100 MG: 100 INJECTION, POWDER, LYOPHILIZED, FOR SOLUTION INTRAVENOUS at 18:00

## 2023-01-01 RX ADMIN — FOLIC ACID 1 MG: 1 TABLET ORAL at 09:46

## 2023-01-01 RX ADMIN — HYDROMORPHONE HYDROCHLORIDE 0.3 MG: 1 INJECTION, SOLUTION INTRAMUSCULAR; INTRAVENOUS; SUBCUTANEOUS at 05:19

## 2023-01-01 RX ADMIN — SUCRALFATE 1 G: 1 TABLET ORAL at 21:10

## 2023-01-01 RX ADMIN — SUCRALFATE 1 G: 1 TABLET ORAL at 17:55

## 2023-01-01 RX ADMIN — POTASSIUM CHLORIDE 10 MEQ: 7.46 INJECTION, SOLUTION INTRAVENOUS at 13:01

## 2023-01-01 RX ADMIN — HYDROMORPHONE HYDROCHLORIDE 0.3 MG: 1 INJECTION, SOLUTION INTRAMUSCULAR; INTRAVENOUS; SUBCUTANEOUS at 07:54

## 2023-01-01 RX ADMIN — NYSTATIN: 100000 CREAM TOPICAL at 20:59

## 2023-01-01 RX ADMIN — METOPROLOL TARTRATE 50 MG: 50 TABLET, FILM COATED ORAL at 07:56

## 2023-01-01 RX ADMIN — PIPERACILLIN AND TAZOBACTAM 3.38 G: 3; .375 INJECTION, POWDER, FOR SOLUTION INTRAVENOUS at 16:13

## 2023-01-01 RX ADMIN — UMECLIDINIUM 1 PUFF: 62.5 AEROSOL, POWDER ORAL at 08:44

## 2023-01-01 RX ADMIN — NYSTATIN: 100000 CREAM TOPICAL at 21:10

## 2023-01-01 RX ADMIN — PANTOPRAZOLE SODIUM 40 MG: 40 TABLET, DELAYED RELEASE ORAL at 09:08

## 2023-01-01 RX ADMIN — HYDROMORPHONE HYDROCHLORIDE 0.5 MG: 1 INJECTION, SOLUTION INTRAMUSCULAR; INTRAVENOUS; SUBCUTANEOUS at 04:08

## 2023-01-01 RX ADMIN — NOREPINEPHRINE BITARTRATE 0.03 MCG/KG/MIN: 0.02 INJECTION, SOLUTION INTRAVENOUS at 16:05

## 2023-01-01 RX ADMIN — PANTOPRAZOLE SODIUM 40 MG: 40 TABLET, DELAYED RELEASE ORAL at 08:26

## 2023-01-01 RX ADMIN — POTASSIUM CHLORIDE 10 MEQ: 7.46 INJECTION, SOLUTION INTRAVENOUS at 02:53

## 2023-01-01 RX ADMIN — GUAIFENESIN 200 MG: 200 SOLUTION ORAL at 20:26

## 2023-01-01 RX ADMIN — POTASSIUM & SODIUM PHOSPHATES POWDER PACK 280-160-250 MG 2 PACKET: 280-160-250 PACK at 13:06

## 2023-01-01 RX ADMIN — DOXYCYCLINE 100 MG: 100 INJECTION, POWDER, LYOPHILIZED, FOR SOLUTION INTRAVENOUS at 21:09

## 2023-01-01 RX ADMIN — HYDROMORPHONE HYDROCHLORIDE 0.5 MG: 1 INJECTION, SOLUTION INTRAMUSCULAR; INTRAVENOUS; SUBCUTANEOUS at 11:31

## 2023-01-01 RX ADMIN — METOPROLOL TARTRATE 50 MG: 50 TABLET, FILM COATED ORAL at 20:49

## 2023-01-01 RX ADMIN — HYDROMORPHONE HYDROCHLORIDE 0.5 MG: 1 INJECTION, SOLUTION INTRAMUSCULAR; INTRAVENOUS; SUBCUTANEOUS at 13:45

## 2023-01-01 RX ADMIN — NYSTATIN: 100000 CREAM TOPICAL at 10:15

## 2023-01-01 RX ADMIN — NYSTATIN: 100000 CREAM TOPICAL at 20:46

## 2023-01-01 RX ADMIN — ACETAMINOPHEN 325 MG: 325 TABLET, FILM COATED ORAL at 04:18

## 2023-01-01 RX ADMIN — MAGNESIUM SULFATE HEPTAHYDRATE 4 G: 40 INJECTION, SOLUTION INTRAVENOUS at 14:09

## 2023-01-01 RX ADMIN — UMECLIDINIUM 1 PUFF: 62.5 AEROSOL, POWDER ORAL at 07:54

## 2023-01-01 RX ADMIN — PANTOPRAZOLE SODIUM 40 MG: 40 TABLET, DELAYED RELEASE ORAL at 10:03

## 2023-01-01 RX ADMIN — SODIUM CHLORIDE, POTASSIUM CHLORIDE, SODIUM LACTATE AND CALCIUM CHLORIDE: 600; 310; 30; 20 INJECTION, SOLUTION INTRAVENOUS at 07:10

## 2023-01-01 RX ADMIN — ALBUTEROL SULFATE 2.5 MG: 2.5 SOLUTION RESPIRATORY (INHALATION) at 02:34

## 2023-01-01 RX ADMIN — GUAIFENESIN 200 MG: 200 SOLUTION ORAL at 04:18

## 2023-01-01 RX ADMIN — MORPHINE SULFATE 15 MG: 15 TABLET ORAL at 12:29

## 2023-01-01 RX ADMIN — LIDOCAINE 1 PATCH: 560 PATCH PERCUTANEOUS; TOPICAL; TRANSDERMAL at 17:38

## 2023-01-01 RX ADMIN — PIPERACILLIN AND TAZOBACTAM 3.38 G: 3; .375 INJECTION, POWDER, FOR SOLUTION INTRAVENOUS at 10:32

## 2023-01-01 RX ADMIN — SUCRALFATE 1 G: 1 TABLET ORAL at 18:37

## 2023-01-01 RX ADMIN — HYDROMORPHONE HYDROCHLORIDE 0.3 MG: 1 INJECTION, SOLUTION INTRAMUSCULAR; INTRAVENOUS; SUBCUTANEOUS at 23:53

## 2023-01-01 RX ADMIN — LOPERAMIDE HYDROCHLORIDE 2 MG: 2 CAPSULE ORAL at 11:36

## 2023-01-01 RX ADMIN — HYDROMORPHONE HYDROCHLORIDE 0.5 MG: 1 INJECTION, SOLUTION INTRAMUSCULAR; INTRAVENOUS; SUBCUTANEOUS at 13:53

## 2023-01-01 RX ADMIN — HYDROMORPHONE HYDROCHLORIDE 0.5 MG: 1 INJECTION, SOLUTION INTRAMUSCULAR; INTRAVENOUS; SUBCUTANEOUS at 02:53

## 2023-01-01 RX ADMIN — PANTOPRAZOLE SODIUM 40 MG: 40 TABLET, DELAYED RELEASE ORAL at 07:56

## 2023-01-01 RX ADMIN — VANCOMYCIN HYDROCHLORIDE 1000 MG: 1 INJECTION, SOLUTION INTRAVENOUS at 06:30

## 2023-01-01 RX ADMIN — THIAMINE HCL TAB 100 MG 100 MG: 100 TAB at 09:35

## 2023-01-01 RX ADMIN — HYDROMORPHONE HYDROCHLORIDE 0.5 MG: 1 INJECTION, SOLUTION INTRAMUSCULAR; INTRAVENOUS; SUBCUTANEOUS at 03:10

## 2023-01-01 RX ADMIN — PANTOPRAZOLE SODIUM 40 MG: 40 TABLET, DELAYED RELEASE ORAL at 06:34

## 2023-01-01 RX ADMIN — PIPERACILLIN AND TAZOBACTAM 3.38 G: 3; .375 INJECTION, POWDER, FOR SOLUTION INTRAVENOUS at 15:32

## 2023-01-01 RX ADMIN — MORPHINE SULFATE 15 MG: 15 TABLET ORAL at 10:00

## 2023-01-01 RX ADMIN — HYDROMORPHONE HYDROCHLORIDE 0.5 MG: 1 INJECTION, SOLUTION INTRAMUSCULAR; INTRAVENOUS; SUBCUTANEOUS at 00:43

## 2023-01-01 RX ADMIN — PIPERACILLIN AND TAZOBACTAM 3.38 G: 3; .375 INJECTION, POWDER, FOR SOLUTION INTRAVENOUS at 22:00

## 2023-01-01 RX ADMIN — HYDROMORPHONE HYDROCHLORIDE 0.5 MG: 1 INJECTION, SOLUTION INTRAMUSCULAR; INTRAVENOUS; SUBCUTANEOUS at 10:45

## 2023-01-01 RX ADMIN — MORPHINE SULFATE 15 MG: 15 TABLET ORAL at 08:34

## 2023-01-01 RX ADMIN — LIDOCAINE 1 PATCH: 560 PATCH PERCUTANEOUS; TOPICAL; TRANSDERMAL at 16:45

## 2023-01-01 RX ADMIN — HYDROMORPHONE HYDROCHLORIDE 0.5 MG: 1 INJECTION, SOLUTION INTRAMUSCULAR; INTRAVENOUS; SUBCUTANEOUS at 17:22

## 2023-01-01 RX ADMIN — ENOXAPARIN SODIUM 40 MG: 40 INJECTION SUBCUTANEOUS at 07:32

## 2023-01-01 RX ADMIN — PANTOPRAZOLE SODIUM 40 MG: 40 TABLET, DELAYED RELEASE ORAL at 07:32

## 2023-01-01 RX ADMIN — THIAMINE HCL TAB 100 MG 100 MG: 100 TAB at 08:26

## 2023-01-01 RX ADMIN — DOXYCYCLINE 100 MG: 100 INJECTION, POWDER, LYOPHILIZED, FOR SOLUTION INTRAVENOUS at 05:15

## 2023-01-01 RX ADMIN — GUAIFENESIN 600 MG: 600 TABLET, EXTENDED RELEASE ORAL at 08:45

## 2023-01-01 RX ADMIN — NYSTATIN: 100000 CREAM TOPICAL at 09:55

## 2023-01-01 RX ADMIN — MORPHINE SULFATE 15 MG: 15 TABLET ORAL at 15:48

## 2023-01-01 RX ADMIN — SUCRALFATE 1 G: 1 TABLET ORAL at 07:44

## 2023-01-01 RX ADMIN — POTASSIUM & SODIUM PHOSPHATES POWDER PACK 280-160-250 MG 2 PACKET: 280-160-250 PACK at 20:59

## 2023-01-01 RX ADMIN — THIAMINE HCL TAB 100 MG 100 MG: 100 TAB at 08:44

## 2023-01-01 RX ADMIN — ALBUTEROL SULFATE 2.5 MG: 2.5 SOLUTION RESPIRATORY (INHALATION) at 18:37

## 2023-01-01 RX ADMIN — ENOXAPARIN SODIUM 40 MG: 40 INJECTION SUBCUTANEOUS at 07:44

## 2023-01-01 RX ADMIN — ENOXAPARIN SODIUM 40 MG: 40 INJECTION SUBCUTANEOUS at 07:56

## 2023-01-01 RX ADMIN — HYDROMORPHONE HYDROCHLORIDE 0.5 MG: 1 INJECTION, SOLUTION INTRAMUSCULAR; INTRAVENOUS; SUBCUTANEOUS at 05:33

## 2023-01-01 RX ADMIN — MORPHINE SULFATE 15 MG: 15 TABLET ORAL at 18:39

## 2023-01-01 RX ADMIN — POTASSIUM & SODIUM PHOSPHATES POWDER PACK 280-160-250 MG 2 PACKET: 280-160-250 PACK at 16:18

## 2023-01-01 RX ADMIN — HYDROMORPHONE HYDROCHLORIDE 0.5 MG: 1 INJECTION, SOLUTION INTRAMUSCULAR; INTRAVENOUS; SUBCUTANEOUS at 09:25

## 2023-01-01 RX ADMIN — SUCRALFATE 1 G: 1 TABLET ORAL at 08:35

## 2023-01-01 RX ADMIN — HYDROMORPHONE HYDROCHLORIDE 0.5 MG: 1 INJECTION, SOLUTION INTRAMUSCULAR; INTRAVENOUS; SUBCUTANEOUS at 08:11

## 2023-01-01 RX ADMIN — LIDOCAINE 1 PATCH: 560 PATCH PERCUTANEOUS; TOPICAL; TRANSDERMAL at 16:01

## 2023-01-01 RX ADMIN — DOXYCYCLINE 100 MG: 100 INJECTION, POWDER, LYOPHILIZED, FOR SOLUTION INTRAVENOUS at 08:14

## 2023-01-01 RX ADMIN — MORPHINE SULFATE 15 MG: 15 TABLET ORAL at 18:23

## 2023-01-01 RX ADMIN — SUCRALFATE 1 G: 1 TABLET ORAL at 13:45

## 2023-01-01 RX ADMIN — THIAMINE HCL TAB 100 MG 100 MG: 100 TAB at 09:15

## 2023-01-01 RX ADMIN — MORPHINE SULFATE 15 MG: 15 TABLET ORAL at 08:29

## 2023-01-01 RX ADMIN — DOXYCYCLINE 100 MG: 100 INJECTION, POWDER, LYOPHILIZED, FOR SOLUTION INTRAVENOUS at 20:58

## 2023-01-01 RX ADMIN — METOPROLOL TARTRATE 50 MG: 50 TABLET, FILM COATED ORAL at 20:26

## 2023-01-01 RX ADMIN — SUCRALFATE 1 G: 1 TABLET ORAL at 13:20

## 2023-01-01 RX ADMIN — SUCRALFATE 1 G: 1 TABLET ORAL at 21:59

## 2023-01-01 RX ADMIN — LIDOCAINE 1 PATCH: 560 PATCH PERCUTANEOUS; TOPICAL; TRANSDERMAL at 16:33

## 2023-01-01 RX ADMIN — SUCRALFATE 1 G: 1 TABLET ORAL at 23:44

## 2023-01-01 RX ADMIN — POTASSIUM CHLORIDE 40 MEQ: 1500 TABLET, EXTENDED RELEASE ORAL at 08:27

## 2023-01-01 RX ADMIN — SODIUM CHLORIDE, POTASSIUM CHLORIDE, SODIUM LACTATE AND CALCIUM CHLORIDE: 600; 310; 30; 20 INJECTION, SOLUTION INTRAVENOUS at 01:20

## 2023-01-01 RX ADMIN — HYDROMORPHONE HYDROCHLORIDE 0.5 MG: 1 INJECTION, SOLUTION INTRAMUSCULAR; INTRAVENOUS; SUBCUTANEOUS at 06:34

## 2023-01-01 RX ADMIN — SUCRALFATE 1 G: 1 TABLET ORAL at 13:53

## 2023-01-01 RX ADMIN — SODIUM CHLORIDE, POTASSIUM CHLORIDE, SODIUM LACTATE AND CALCIUM CHLORIDE: 600; 310; 30; 20 INJECTION, SOLUTION INTRAVENOUS at 01:15

## 2023-01-01 RX ADMIN — METOPROLOL TARTRATE 50 MG: 50 TABLET, FILM COATED ORAL at 11:26

## 2023-01-01 RX ADMIN — PANTOPRAZOLE SODIUM 40 MG: 40 TABLET, DELAYED RELEASE ORAL at 09:46

## 2023-01-01 RX ADMIN — SUCRALFATE 1 G: 1 TABLET ORAL at 15:04

## 2023-01-01 RX ADMIN — THERA TABS 1 TABLET: TAB at 07:55

## 2023-01-01 RX ADMIN — PIPERACILLIN AND TAZOBACTAM 3.38 G: 3; .375 INJECTION, POWDER, FOR SOLUTION INTRAVENOUS at 10:58

## 2023-01-01 RX ADMIN — POTASSIUM CHLORIDE 20 MEQ: 1.5 POWDER, FOR SOLUTION ORAL at 08:26

## 2023-01-01 RX ADMIN — METOPROLOL TARTRATE 50 MG: 50 TABLET, FILM COATED ORAL at 20:44

## 2023-01-01 RX ADMIN — MORPHINE SULFATE 15 MG: 15 TABLET ORAL at 17:32

## 2023-01-01 RX ADMIN — NYSTATIN: 100000 CREAM TOPICAL at 08:14

## 2023-01-01 RX ADMIN — SODIUM CHLORIDE, POTASSIUM CHLORIDE, SODIUM LACTATE AND CALCIUM CHLORIDE: 600; 310; 30; 20 INJECTION, SOLUTION INTRAVENOUS at 18:03

## 2023-01-01 RX ADMIN — ENOXAPARIN SODIUM 40 MG: 40 INJECTION SUBCUTANEOUS at 08:44

## 2023-01-01 RX ADMIN — SODIUM PHOSPHATE, MONOBASIC, MONOHYDRATE AND SODIUM PHOSPHATE, DIBASIC, ANHYDROUS 9 MMOL: 142; 276 INJECTION, SOLUTION INTRAVENOUS at 08:23

## 2023-01-01 RX ADMIN — SUCRALFATE 1 G: 1 TABLET ORAL at 21:40

## 2023-01-01 RX ADMIN — GUAIFENESIN 600 MG: 600 TABLET, EXTENDED RELEASE ORAL at 20:42

## 2023-01-01 RX ADMIN — ENOXAPARIN SODIUM 40 MG: 40 INJECTION SUBCUTANEOUS at 08:13

## 2023-01-01 RX ADMIN — HYDROMORPHONE HYDROCHLORIDE 0.5 MG: 1 INJECTION, SOLUTION INTRAMUSCULAR; INTRAVENOUS; SUBCUTANEOUS at 16:18

## 2023-01-01 RX ADMIN — PIPERACILLIN AND TAZOBACTAM 3.38 G: 3; .375 INJECTION, POWDER, FOR SOLUTION INTRAVENOUS at 16:45

## 2023-01-01 RX ADMIN — HYDROMORPHONE HYDROCHLORIDE 0.5 MG: 1 INJECTION, SOLUTION INTRAMUSCULAR; INTRAVENOUS; SUBCUTANEOUS at 16:52

## 2023-01-01 RX ADMIN — NYSTATIN: 100000 CREAM TOPICAL at 09:20

## 2023-01-01 RX ADMIN — PIPERACILLIN AND TAZOBACTAM 3.38 G: 3; .375 INJECTION, POWDER, FOR SOLUTION INTRAVENOUS at 16:18

## 2023-01-01 RX ADMIN — HYDROMORPHONE HYDROCHLORIDE 0.5 MG: 1 INJECTION, SOLUTION INTRAMUSCULAR; INTRAVENOUS; SUBCUTANEOUS at 19:57

## 2023-01-01 RX ADMIN — PIPERACILLIN AND TAZOBACTAM 3.38 G: 3; .375 INJECTION, POWDER, FOR SOLUTION INTRAVENOUS at 19:50

## 2023-01-01 RX ADMIN — HYDROMORPHONE HYDROCHLORIDE 0.5 MG: 1 INJECTION, SOLUTION INTRAMUSCULAR; INTRAVENOUS; SUBCUTANEOUS at 04:47

## 2023-01-01 RX ADMIN — MULTIPLE VITAMINS W/ MINERALS TAB 1 TABLET: TAB at 08:44

## 2023-01-01 RX ADMIN — HYDROMORPHONE HYDROCHLORIDE 0.5 MG: 1 INJECTION, SOLUTION INTRAMUSCULAR; INTRAVENOUS; SUBCUTANEOUS at 19:30

## 2023-01-01 RX ADMIN — DOXYCYCLINE 100 MG: 100 INJECTION, POWDER, LYOPHILIZED, FOR SOLUTION INTRAVENOUS at 21:11

## 2023-01-01 RX ADMIN — SUCRALFATE 1 G: 1 TABLET ORAL at 17:40

## 2023-01-01 RX ADMIN — HYDROMORPHONE HYDROCHLORIDE 0.5 MG: 1 INJECTION, SOLUTION INTRAMUSCULAR; INTRAVENOUS; SUBCUTANEOUS at 07:43

## 2023-01-01 RX ADMIN — THIAMINE HCL TAB 100 MG 100 MG: 100 TAB at 08:12

## 2023-01-01 RX ADMIN — LIDOCAINE 1 PATCH: 560 PATCH PERCUTANEOUS; TOPICAL; TRANSDERMAL at 16:12

## 2023-01-01 RX ADMIN — NYSTATIN: 100000 CREAM TOPICAL at 22:03

## 2023-01-01 RX ADMIN — HYDROMORPHONE HYDROCHLORIDE 0.5 MG: 1 INJECTION, SOLUTION INTRAMUSCULAR; INTRAVENOUS; SUBCUTANEOUS at 16:33

## 2023-01-01 RX ADMIN — PIPERACILLIN AND TAZOBACTAM 3.38 G: 3; .375 INJECTION, POWDER, FOR SOLUTION INTRAVENOUS at 22:04

## 2023-01-01 RX ADMIN — SUCRALFATE 1 G: 1 TABLET ORAL at 08:26

## 2023-01-01 RX ADMIN — METOPROLOL TARTRATE 50 MG: 50 TABLET, FILM COATED ORAL at 09:15

## 2023-01-01 RX ADMIN — ACETAMINOPHEN 650 MG: 325 TABLET, FILM COATED ORAL at 10:33

## 2023-01-01 RX ADMIN — PIPERACILLIN AND TAZOBACTAM 3.38 G: 3; .375 INJECTION, POWDER, FOR SOLUTION INTRAVENOUS at 10:31

## 2023-01-01 RX ADMIN — THERA TABS 1 TABLET: TAB at 10:03

## 2023-01-01 RX ADMIN — PIPERACILLIN AND TAZOBACTAM 3.38 G: 3; .375 INJECTION, POWDER, FOR SOLUTION INTRAVENOUS at 01:38

## 2023-01-01 RX ADMIN — NYSTATIN: 100000 CREAM TOPICAL at 20:56

## 2023-01-01 RX ADMIN — NOREPINEPHRINE BITARTRATE 0.05 MCG/KG/MIN: 0.02 INJECTION, SOLUTION INTRAVENOUS at 01:34

## 2023-01-01 RX ADMIN — HYDROMORPHONE HYDROCHLORIDE 0.3 MG: 1 INJECTION, SOLUTION INTRAMUSCULAR; INTRAVENOUS; SUBCUTANEOUS at 12:30

## 2023-01-01 RX ADMIN — ACETAMINOPHEN 650 MG: 325 TABLET, FILM COATED ORAL at 18:23

## 2023-01-01 RX ADMIN — HYDROMORPHONE HYDROCHLORIDE 0.5 MG: 1 INJECTION, SOLUTION INTRAMUSCULAR; INTRAVENOUS; SUBCUTANEOUS at 19:47

## 2023-01-01 RX ADMIN — METOPROLOL TARTRATE 50 MG: 50 TABLET, FILM COATED ORAL at 21:10

## 2023-01-01 RX ADMIN — UMECLIDINIUM 1 PUFF: 62.5 AEROSOL, POWDER ORAL at 10:00

## 2023-01-01 RX ADMIN — ENOXAPARIN SODIUM 40 MG: 40 INJECTION SUBCUTANEOUS at 10:03

## 2023-01-01 RX ADMIN — SODIUM CHLORIDE, POTASSIUM CHLORIDE, SODIUM LACTATE AND CALCIUM CHLORIDE 500 ML: 600; 310; 30; 20 INJECTION, SOLUTION INTRAVENOUS at 16:01

## 2023-01-01 RX ADMIN — NYSTATIN: 100000 CREAM TOPICAL at 22:15

## 2023-01-01 RX ADMIN — MULTIPLE VITAMINS W/ MINERALS TAB 1 TABLET: TAB at 12:03

## 2023-01-01 RX ADMIN — THIAMINE HCL TAB 100 MG 100 MG: 100 TAB at 07:55

## 2023-01-01 RX ADMIN — UMECLIDINIUM 1 PUFF: 62.5 AEROSOL, POWDER ORAL at 09:14

## 2023-01-01 RX ADMIN — HYDROMORPHONE HYDROCHLORIDE 0.5 MG: 1 INJECTION, SOLUTION INTRAMUSCULAR; INTRAVENOUS; SUBCUTANEOUS at 22:43

## 2023-01-01 RX ADMIN — MORPHINE SULFATE 15 MG: 15 TABLET ORAL at 09:14

## 2023-01-01 RX ADMIN — NYSTATIN: 100000 CREAM TOPICAL at 20:08

## 2023-01-01 RX ADMIN — LOPERAMIDE HYDROCHLORIDE 2 MG: 2 CAPSULE ORAL at 18:23

## 2023-01-01 RX ADMIN — FOLIC ACID 1 MG: 1 TABLET ORAL at 12:03

## 2023-01-01 RX ADMIN — MORPHINE SULFATE 15 MG: 15 TABLET ORAL at 06:02

## 2023-01-01 RX ADMIN — PIPERACILLIN AND TAZOBACTAM 3.38 G: 3; .375 INJECTION, POWDER, FOR SOLUTION INTRAVENOUS at 04:08

## 2023-01-01 RX ADMIN — FOLIC ACID 1 MG: 1 TABLET ORAL at 08:45

## 2023-01-01 RX ADMIN — ACETAMINOPHEN 650 MG: 325 TABLET, FILM COATED ORAL at 01:35

## 2023-01-01 RX ADMIN — PIPERACILLIN AND TAZOBACTAM 3.38 G: 3; .375 INJECTION, POWDER, FOR SOLUTION INTRAVENOUS at 04:06

## 2023-01-01 RX ADMIN — SUCRALFATE 1 G: 1 TABLET ORAL at 09:15

## 2023-01-01 RX ADMIN — MORPHINE SULFATE 15 MG: 15 TABLET ORAL at 21:04

## 2023-01-01 RX ADMIN — POTASSIUM & SODIUM PHOSPHATES POWDER PACK 280-160-250 MG 1 PACKET: 280-160-250 PACK at 11:26

## 2023-01-01 RX ADMIN — FOLIC ACID 1 MG: 1 TABLET ORAL at 09:35

## 2023-01-01 RX ADMIN — FOLIC ACID 1 MG: 1 TABLET ORAL at 08:26

## 2023-01-01 RX ADMIN — ENOXAPARIN SODIUM 40 MG: 40 INJECTION SUBCUTANEOUS at 07:45

## 2023-01-01 RX ADMIN — METOPROLOL TARTRATE 50 MG: 50 TABLET, FILM COATED ORAL at 08:13

## 2023-01-01 RX ADMIN — POTASSIUM CHLORIDE 10 MEQ: 7.46 INJECTION, SOLUTION INTRAVENOUS at 11:50

## 2023-01-01 RX ADMIN — NYSTATIN: 100000 CREAM TOPICAL at 21:59

## 2023-01-01 RX ADMIN — MORPHINE SULFATE 15 MG: 15 TABLET ORAL at 04:26

## 2023-01-01 RX ADMIN — SUCRALFATE 1 G: 1 TABLET ORAL at 14:43

## 2023-01-01 RX ADMIN — POTASSIUM & SODIUM PHOSPHATES POWDER PACK 280-160-250 MG 1 PACKET: 280-160-250 PACK at 09:08

## 2023-01-01 RX ADMIN — ENOXAPARIN SODIUM 40 MG: 40 INJECTION SUBCUTANEOUS at 08:25

## 2023-01-01 RX ADMIN — HYDROMORPHONE HYDROCHLORIDE 0.5 MG: 1 INJECTION, SOLUTION INTRAMUSCULAR; INTRAVENOUS; SUBCUTANEOUS at 10:04

## 2023-01-01 RX ADMIN — PIPERACILLIN AND TAZOBACTAM 3.38 G: 3; .375 INJECTION, POWDER, FOR SOLUTION INTRAVENOUS at 13:47

## 2023-01-01 RX ADMIN — PIPERACILLIN AND TAZOBACTAM 3.38 G: 3; .375 INJECTION, POWDER, FOR SOLUTION INTRAVENOUS at 04:29

## 2023-01-01 RX ADMIN — MORPHINE SULFATE 15 MG: 15 TABLET ORAL at 23:53

## 2023-01-01 RX ADMIN — HYDROMORPHONE HYDROCHLORIDE 0.5 MG: 1 INJECTION, SOLUTION INTRAMUSCULAR; INTRAVENOUS; SUBCUTANEOUS at 00:20

## 2023-01-01 RX ADMIN — HYDROMORPHONE HYDROCHLORIDE 0.5 MG: 1 INJECTION, SOLUTION INTRAMUSCULAR; INTRAVENOUS; SUBCUTANEOUS at 04:18

## 2023-01-01 RX ADMIN — PIPERACILLIN AND TAZOBACTAM 3.38 G: 3; .375 INJECTION, POWDER, FOR SOLUTION INTRAVENOUS at 22:11

## 2023-01-01 RX ADMIN — THERA TABS 1 TABLET: TAB at 16:33

## 2023-01-01 RX ADMIN — THERA TABS 1 TABLET: TAB at 09:15

## 2023-01-01 RX ADMIN — SUCRALFATE 1 G: 1 TABLET ORAL at 22:28

## 2023-01-01 RX ADMIN — FOLIC ACID 1 MG: 1 TABLET ORAL at 09:08

## 2023-01-01 RX ADMIN — LIDOCAINE HYDROCHLORIDE 1 ML: 10 INJECTION, SOLUTION EPIDURAL; INFILTRATION; INTRACAUDAL; PERINEURAL at 09:10

## 2023-01-01 RX ADMIN — GUAIFENESIN 600 MG: 600 TABLET, EXTENDED RELEASE ORAL at 20:53

## 2023-01-01 RX ADMIN — HYDROMORPHONE HYDROCHLORIDE 0.5 MG: 1 INJECTION, SOLUTION INTRAMUSCULAR; INTRAVENOUS; SUBCUTANEOUS at 20:47

## 2023-01-01 RX ADMIN — METOPROLOL TARTRATE 50 MG: 50 TABLET, FILM COATED ORAL at 07:44

## 2023-01-01 RX ADMIN — NYSTATIN: 100000 CREAM TOPICAL at 09:15

## 2023-01-01 RX ADMIN — SUCRALFATE 1 G: 1 TABLET ORAL at 09:08

## 2023-01-01 RX ADMIN — ACETAMINOPHEN 650 MG: 325 TABLET, FILM COATED ORAL at 13:20

## 2023-01-01 RX ADMIN — HYDROMORPHONE HYDROCHLORIDE 0.3 MG: 1 INJECTION, SOLUTION INTRAMUSCULAR; INTRAVENOUS; SUBCUTANEOUS at 20:58

## 2023-01-01 RX ADMIN — LIDOCAINE 1 PATCH: 560 PATCH PERCUTANEOUS; TOPICAL; TRANSDERMAL at 16:25

## 2023-01-01 RX ADMIN — UMECLIDINIUM 1 PUFF: 62.5 AEROSOL, POWDER ORAL at 08:14

## 2023-01-01 RX ADMIN — NYSTATIN: 100000 CREAM TOPICAL at 23:45

## 2023-01-01 RX ADMIN — GABAPENTIN 300 MG: 300 CAPSULE ORAL at 09:46

## 2023-01-01 RX ADMIN — POTASSIUM CHLORIDE 20 MEQ: 1500 TABLET, EXTENDED RELEASE ORAL at 12:57

## 2023-01-01 RX ADMIN — HYDROMORPHONE HYDROCHLORIDE 0.5 MG: 1 INJECTION, SOLUTION INTRAMUSCULAR; INTRAVENOUS; SUBCUTANEOUS at 13:26

## 2023-01-01 RX ADMIN — SUCRALFATE 1 G: 1 TABLET ORAL at 19:25

## 2023-01-01 RX ADMIN — HYDROMORPHONE HYDROCHLORIDE 0.5 MG: 1 INJECTION, SOLUTION INTRAMUSCULAR; INTRAVENOUS; SUBCUTANEOUS at 11:35

## 2023-01-01 RX ADMIN — SODIUM CHLORIDE: 9 INJECTION, SOLUTION INTRAVENOUS at 01:38

## 2023-01-01 RX ADMIN — MORPHINE SULFATE 15 MG: 15 TABLET ORAL at 08:44

## 2023-01-01 RX ADMIN — MORPHINE SULFATE 15 MG: 15 TABLET ORAL at 21:10

## 2023-01-01 RX ADMIN — HYDROMORPHONE HYDROCHLORIDE 0.5 MG: 1 INJECTION, SOLUTION INTRAMUSCULAR; INTRAVENOUS; SUBCUTANEOUS at 11:49

## 2023-01-01 RX ADMIN — HYDROMORPHONE HYDROCHLORIDE 0.5 MG: 1 INJECTION, SOLUTION INTRAMUSCULAR; INTRAVENOUS; SUBCUTANEOUS at 21:59

## 2023-01-01 RX ADMIN — HYDROMORPHONE HYDROCHLORIDE 0.5 MG: 1 INJECTION, SOLUTION INTRAMUSCULAR; INTRAVENOUS; SUBCUTANEOUS at 16:26

## 2023-01-01 RX ADMIN — ENOXAPARIN SODIUM 40 MG: 40 INJECTION SUBCUTANEOUS at 09:25

## 2023-01-01 RX ADMIN — SUCRALFATE 1 G: 1 TABLET ORAL at 10:03

## 2023-01-01 RX ADMIN — MORPHINE SULFATE 15 MG: 15 TABLET ORAL at 05:06

## 2023-01-01 RX ADMIN — PIPERACILLIN AND TAZOBACTAM 3.38 G: 3; .375 INJECTION, POWDER, FOR SOLUTION INTRAVENOUS at 03:19

## 2023-01-01 RX ADMIN — POTASSIUM CHLORIDE 10 MEQ: 7.46 INJECTION, SOLUTION INTRAVENOUS at 09:22

## 2023-01-01 RX ADMIN — LOPERAMIDE HYDROCHLORIDE 2 MG: 2 CAPSULE ORAL at 23:13

## 2023-01-01 RX ADMIN — PIPERACILLIN AND TAZOBACTAM 3.38 G: 3; .375 INJECTION, POWDER, FOR SOLUTION INTRAVENOUS at 04:21

## 2023-01-01 RX ADMIN — METOPROLOL TARTRATE 50 MG: 50 TABLET, FILM COATED ORAL at 20:53

## 2023-01-01 RX ADMIN — THIAMINE HCL TAB 100 MG 100 MG: 100 TAB at 09:08

## 2023-01-01 RX ADMIN — MORPHINE SULFATE 15 MG: 15 TABLET ORAL at 19:49

## 2023-01-01 RX ADMIN — THERA TABS 1 TABLET: TAB at 08:13

## 2023-01-01 RX ADMIN — HYDROMORPHONE HYDROCHLORIDE 0.5 MG: 1 INJECTION, SOLUTION INTRAMUSCULAR; INTRAVENOUS; SUBCUTANEOUS at 06:06

## 2023-01-01 RX ADMIN — SUCRALFATE 1 G: 1 TABLET ORAL at 08:45

## 2023-01-01 RX ADMIN — PIPERACILLIN AND TAZOBACTAM 3.38 G: 3; .375 INJECTION, POWDER, FOR SOLUTION INTRAVENOUS at 07:45

## 2023-01-01 RX ADMIN — UMECLIDINIUM 1 PUFF: 62.5 AEROSOL, POWDER ORAL at 10:55

## 2023-01-01 RX ADMIN — MULTIPLE VITAMINS W/ MINERALS TAB 1 TABLET: TAB at 13:53

## 2023-01-01 RX ADMIN — METOPROLOL TARTRATE 50 MG: 50 TABLET, FILM COATED ORAL at 20:06

## 2023-01-01 RX ADMIN — HYDROMORPHONE HYDROCHLORIDE 0.3 MG: 1 INJECTION, SOLUTION INTRAMUSCULAR; INTRAVENOUS; SUBCUTANEOUS at 11:49

## 2023-01-01 RX ADMIN — ACETAMINOPHEN 650 MG: 325 TABLET, FILM COATED ORAL at 06:06

## 2023-01-01 RX ADMIN — FOLIC ACID 1 MG: 1 TABLET ORAL at 08:13

## 2023-01-01 RX ADMIN — LIDOCAINE 1 PATCH: 560 PATCH PERCUTANEOUS; TOPICAL; TRANSDERMAL at 16:16

## 2023-01-01 RX ADMIN — MORPHINE SULFATE 15 MG: 15 TABLET ORAL at 04:06

## 2023-01-01 RX ADMIN — DOXYCYCLINE 100 MG: 100 INJECTION, POWDER, LYOPHILIZED, FOR SOLUTION INTRAVENOUS at 09:02

## 2023-01-01 RX ADMIN — SUCRALFATE 1 G: 1 TABLET ORAL at 22:20

## 2023-01-01 RX ADMIN — SUCRALFATE 1 G: 1 TABLET ORAL at 23:45

## 2023-01-01 RX ADMIN — HYDROMORPHONE HYDROCHLORIDE 0.5 MG: 1 INJECTION, SOLUTION INTRAMUSCULAR; INTRAVENOUS; SUBCUTANEOUS at 05:32

## 2023-01-01 RX ADMIN — SUCRALFATE 1 G: 1 TABLET ORAL at 17:22

## 2023-01-01 RX ADMIN — PANTOPRAZOLE SODIUM 40 MG: 40 TABLET, DELAYED RELEASE ORAL at 06:59

## 2023-01-01 RX ADMIN — POTASSIUM CHLORIDE 10 MEQ: 7.46 INJECTION, SOLUTION INTRAVENOUS at 08:15

## 2023-01-01 RX ADMIN — SUCRALFATE 1 G: 1 TABLET ORAL at 18:23

## 2023-01-01 RX ADMIN — METOPROLOL TARTRATE 50 MG: 50 TABLET, FILM COATED ORAL at 08:26

## 2023-01-01 RX ADMIN — ACETAMINOPHEN 650 MG: 325 TABLET, FILM COATED ORAL at 18:39

## 2023-01-01 RX ADMIN — HYDROMORPHONE HYDROCHLORIDE 0.5 MG: 1 INJECTION, SOLUTION INTRAMUSCULAR; INTRAVENOUS; SUBCUTANEOUS at 01:30

## 2023-01-01 RX ADMIN — MORPHINE SULFATE 15 MG: 15 TABLET ORAL at 00:18

## 2023-01-01 RX ADMIN — SUCRALFATE 1 G: 1 TABLET ORAL at 09:35

## 2023-01-01 RX ADMIN — THIAMINE HCL TAB 100 MG 100 MG: 100 TAB at 08:35

## 2023-01-01 RX ADMIN — MULTIPLE VITAMINS W/ MINERALS TAB 1 TABLET: TAB at 07:44

## 2023-01-01 RX ADMIN — MORPHINE SULFATE 15 MG: 15 TABLET ORAL at 18:43

## 2023-01-01 RX ADMIN — HYDROMORPHONE HYDROCHLORIDE 0.5 MG: 1 INJECTION, SOLUTION INTRAMUSCULAR; INTRAVENOUS; SUBCUTANEOUS at 10:35

## 2023-01-01 RX ADMIN — PIPERACILLIN AND TAZOBACTAM 3.38 G: 3; .375 INJECTION, POWDER, FOR SOLUTION INTRAVENOUS at 16:25

## 2023-01-01 RX ADMIN — LIDOCAINE 1 PATCH: 560 PATCH PERCUTANEOUS; TOPICAL; TRANSDERMAL at 16:18

## 2023-01-01 RX ADMIN — FOLIC ACID 1 MG: 1 TABLET ORAL at 10:04

## 2023-01-01 RX ADMIN — MORPHINE SULFATE 15 MG: 15 TABLET ORAL at 14:49

## 2023-01-01 RX ADMIN — FOLIC ACID 1 MG: 1 TABLET ORAL at 09:15

## 2023-01-01 RX ADMIN — SODIUM CHLORIDE, POTASSIUM CHLORIDE, SODIUM LACTATE AND CALCIUM CHLORIDE: 600; 310; 30; 20 INJECTION, SOLUTION INTRAVENOUS at 12:57

## 2023-01-01 RX ADMIN — POTASSIUM CHLORIDE 10 MEQ: 7.46 INJECTION, SOLUTION INTRAVENOUS at 04:07

## 2023-01-01 RX ADMIN — HYDROMORPHONE HYDROCHLORIDE 0.3 MG: 1 INJECTION, SOLUTION INTRAMUSCULAR; INTRAVENOUS; SUBCUTANEOUS at 16:25

## 2023-01-01 RX ADMIN — FOLIC ACID 1 MG: 1 TABLET ORAL at 07:55

## 2023-01-01 RX ADMIN — GUAIFENESIN 600 MG: 600 TABLET, EXTENDED RELEASE ORAL at 07:44

## 2023-01-01 RX ADMIN — LIDOCAINE 1 PATCH: 560 PATCH PERCUTANEOUS; TOPICAL; TRANSDERMAL at 15:48

## 2023-01-01 RX ADMIN — THERA TABS 1 TABLET: TAB at 08:27

## 2023-01-01 RX ADMIN — HYDROMORPHONE HYDROCHLORIDE 0.5 MG: 1 INJECTION, SOLUTION INTRAMUSCULAR; INTRAVENOUS; SUBCUTANEOUS at 08:22

## 2023-01-01 RX ADMIN — PANTOPRAZOLE SODIUM 40 MG: 40 TABLET, DELAYED RELEASE ORAL at 06:42

## 2023-01-01 RX ADMIN — POTASSIUM CHLORIDE 20 MEQ: 1.5 POWDER, FOR SOLUTION ORAL at 10:03

## 2023-01-01 RX ADMIN — LIDOCAINE 1 PATCH: 560 PATCH PERCUTANEOUS; TOPICAL; TRANSDERMAL at 16:05

## 2023-01-01 RX ADMIN — MORPHINE SULFATE 15 MG: 15 TABLET ORAL at 04:05

## 2023-01-01 RX ADMIN — GUAIFENESIN 600 MG: 600 TABLET, EXTENDED RELEASE ORAL at 09:15

## 2023-01-01 RX ADMIN — SUCRALFATE 1 G: 1 TABLET ORAL at 13:57

## 2023-01-01 RX ADMIN — SUCRALFATE 1 G: 1 TABLET ORAL at 20:03

## 2023-01-01 RX ADMIN — NYSTATIN: 100000 CREAM TOPICAL at 08:40

## 2023-01-01 RX ADMIN — SUCRALFATE 1 G: 1 TABLET ORAL at 13:27

## 2023-01-01 RX ADMIN — GUAIFENESIN 600 MG: 600 TABLET, EXTENDED RELEASE ORAL at 20:07

## 2023-01-01 RX ADMIN — UMECLIDINIUM 1 PUFF: 62.5 AEROSOL, POWDER ORAL at 08:41

## 2023-01-01 RX ADMIN — PANTOPRAZOLE SODIUM 40 MG: 40 TABLET, DELAYED RELEASE ORAL at 08:12

## 2023-01-01 RX ADMIN — METOPROLOL TARTRATE 50 MG: 50 TABLET, FILM COATED ORAL at 20:07

## 2023-01-01 RX ADMIN — HYDROMORPHONE HYDROCHLORIDE 0.5 MG: 1 INJECTION, SOLUTION INTRAMUSCULAR; INTRAVENOUS; SUBCUTANEOUS at 02:59

## 2023-01-01 RX ADMIN — PIPERACILLIN AND TAZOBACTAM 3.38 G: 3; .375 INJECTION, POWDER, FOR SOLUTION INTRAVENOUS at 10:02

## 2023-01-01 RX ADMIN — MAGNESIUM SULFATE HEPTAHYDRATE 4 G: 40 INJECTION, SOLUTION INTRAVENOUS at 13:17

## 2023-01-01 RX ADMIN — LOPERAMIDE HYDROCHLORIDE 2 MG: 2 CAPSULE ORAL at 16:32

## 2023-01-01 RX ADMIN — MORPHINE SULFATE 15 MG: 15 TABLET ORAL at 15:15

## 2023-01-01 RX ADMIN — THERA TABS 1 TABLET: TAB at 09:08

## 2023-01-01 RX ADMIN — POTASSIUM CHLORIDE 10 MEQ: 7.46 INJECTION, SOLUTION INTRAVENOUS at 06:55

## 2023-01-01 RX ADMIN — PIPERACILLIN AND TAZOBACTAM 3.38 G: 3; .375 INJECTION, POWDER, FOR SOLUTION INTRAVENOUS at 02:36

## 2023-01-01 RX ADMIN — MORPHINE SULFATE 15 MG: 15 TABLET ORAL at 10:33

## 2023-01-01 RX ADMIN — HYDROMORPHONE HYDROCHLORIDE 0.3 MG: 1 INJECTION, SOLUTION INTRAMUSCULAR; INTRAVENOUS; SUBCUTANEOUS at 22:20

## 2023-01-01 RX ADMIN — HYDROMORPHONE HYDROCHLORIDE 0.5 MG: 1 INJECTION, SOLUTION INTRAMUSCULAR; INTRAVENOUS; SUBCUTANEOUS at 09:40

## 2023-01-01 RX ADMIN — DOXYCYCLINE 100 MG: 100 INJECTION, POWDER, LYOPHILIZED, FOR SOLUTION INTRAVENOUS at 20:49

## 2023-01-01 RX ADMIN — METOPROLOL TARTRATE 50 MG: 50 TABLET, FILM COATED ORAL at 10:04

## 2023-01-01 RX ADMIN — SUCRALFATE 1 G: 1 TABLET ORAL at 13:06

## 2023-01-01 RX ADMIN — DOXYCYCLINE 100 MG: 100 INJECTION, POWDER, LYOPHILIZED, FOR SOLUTION INTRAVENOUS at 08:26

## 2023-01-01 RX ADMIN — THIAMINE HCL TAB 100 MG 100 MG: 100 TAB at 09:47

## 2023-01-01 RX ADMIN — POTASSIUM CHLORIDE 20 MEQ: 1500 TABLET, EXTENDED RELEASE ORAL at 13:06

## 2023-01-01 RX ADMIN — UMECLIDINIUM 1 PUFF: 62.5 AEROSOL, POWDER ORAL at 07:44

## 2023-01-01 RX ADMIN — ACETAMINOPHEN 650 MG: 325 TABLET, FILM COATED ORAL at 20:26

## 2023-01-01 RX ADMIN — HYDROMORPHONE HYDROCHLORIDE 0.5 MG: 1 INJECTION, SOLUTION INTRAMUSCULAR; INTRAVENOUS; SUBCUTANEOUS at 14:14

## 2023-01-01 RX ADMIN — GUAIFENESIN 600 MG: 600 TABLET, EXTENDED RELEASE ORAL at 08:35

## 2023-01-01 RX ADMIN — NYSTATIN: 100000 CREAM TOPICAL at 11:32

## 2023-01-01 RX ADMIN — UMECLIDINIUM 1 PUFF: 62.5 AEROSOL, POWDER ORAL at 10:10

## 2023-01-01 RX ADMIN — SUCRALFATE 1 G: 1 TABLET ORAL at 18:05

## 2023-01-01 RX ADMIN — NYSTATIN: 100000 CREAM TOPICAL at 07:54

## 2023-01-01 RX ADMIN — ACETAMINOPHEN 650 MG: 325 TABLET, FILM COATED ORAL at 09:14

## 2023-01-01 RX ADMIN — PIPERACILLIN AND TAZOBACTAM 3.38 G: 3; .375 INJECTION, POWDER, FOR SOLUTION INTRAVENOUS at 10:10

## 2023-01-01 RX ADMIN — ENOXAPARIN SODIUM 40 MG: 40 INJECTION SUBCUTANEOUS at 08:40

## 2023-01-01 ASSESSMENT — ACTIVITIES OF DAILY LIVING (ADL)
ADLS_ACUITY_SCORE: 46
ADLS_ACUITY_SCORE: 46
ADLS_ACUITY_SCORE: 50
ADLS_ACUITY_SCORE: 45
ADLS_ACUITY_SCORE: 50
ADLS_ACUITY_SCORE: 45
ADLS_ACUITY_SCORE: 43
ADLS_ACUITY_SCORE: 46
ADLS_ACUITY_SCORE: 49
ADLS_ACUITY_SCORE: 50
ADLS_ACUITY_SCORE: 43
ADLS_ACUITY_SCORE: 47
ADLS_ACUITY_SCORE: 46
ADLS_ACUITY_SCORE: 42
ADLS_ACUITY_SCORE: 33
ADLS_ACUITY_SCORE: 50
ADLS_ACUITY_SCORE: 43
ADLS_ACUITY_SCORE: 46
ADLS_ACUITY_SCORE: 43
ADLS_ACUITY_SCORE: 47
ADLS_ACUITY_SCORE: 43
ADLS_ACUITY_SCORE: 43
ADLS_ACUITY_SCORE: 47
ADLS_ACUITY_SCORE: 46
ADLS_ACUITY_SCORE: 45
ADLS_ACUITY_SCORE: 46
ADLS_ACUITY_SCORE: 43
ADLS_ACUITY_SCORE: 47
ADLS_ACUITY_SCORE: 43
ADLS_ACUITY_SCORE: 47
ADLS_ACUITY_SCORE: 46
ADLS_ACUITY_SCORE: 47
ADLS_ACUITY_SCORE: 43
ADLS_ACUITY_SCORE: 45
ADLS_ACUITY_SCORE: 46
ADLS_ACUITY_SCORE: 45
ADLS_ACUITY_SCORE: 46
ADLS_ACUITY_SCORE: 46
ADLS_ACUITY_SCORE: 42
ADLS_ACUITY_SCORE: 49
ADLS_ACUITY_SCORE: 48
ADLS_ACUITY_SCORE: 43
ADLS_ACUITY_SCORE: 48
ADLS_ACUITY_SCORE: 50
ADLS_ACUITY_SCORE: 47
ADLS_ACUITY_SCORE: 49
ADLS_ACUITY_SCORE: 48
ADLS_ACUITY_SCORE: 45
ADLS_ACUITY_SCORE: 43
ADLS_ACUITY_SCORE: 43
ADLS_ACUITY_SCORE: 49
ADLS_ACUITY_SCORE: 46
ADLS_ACUITY_SCORE: 45
ADLS_ACUITY_SCORE: 43
ADLS_ACUITY_SCORE: 43
ADLS_ACUITY_SCORE: 45
ADLS_ACUITY_SCORE: 43
ADLS_ACUITY_SCORE: 43
ADLS_ACUITY_SCORE: 46
ADLS_ACUITY_SCORE: 43
ADLS_ACUITY_SCORE: 42
ADLS_ACUITY_SCORE: 47
ADLS_ACUITY_SCORE: 50
ADLS_ACUITY_SCORE: 43
ADLS_ACUITY_SCORE: 48
ADLS_ACUITY_SCORE: 48
ADLS_ACUITY_SCORE: 47
ADLS_ACUITY_SCORE: 47
CHANGE_IN_FUNCTIONAL_STATUS_SINCE_ONSET_OF_CURRENT_ILLNESS/INJURY: YES
ADLS_ACUITY_SCORE: 47
ADLS_ACUITY_SCORE: 46
ADLS_ACUITY_SCORE: 43
ADLS_ACUITY_SCORE: 43
ADLS_ACUITY_SCORE: 49
ADLS_ACUITY_SCORE: 46
ADLS_ACUITY_SCORE: 43
ADLS_ACUITY_SCORE: 48
ADLS_ACUITY_SCORE: 46
ADLS_ACUITY_SCORE: 46
ADLS_ACUITY_SCORE: 43
ADLS_ACUITY_SCORE: 43
ADLS_ACUITY_SCORE: 46
ADLS_ACUITY_SCORE: 48
ADLS_ACUITY_SCORE: 46
ADLS_ACUITY_SCORE: 46
ADLS_ACUITY_SCORE: 48
ADLS_ACUITY_SCORE: 45
ADLS_ACUITY_SCORE: 49
ADLS_ACUITY_SCORE: 43
ADLS_ACUITY_SCORE: 46
ADLS_ACUITY_SCORE: 47
ADLS_ACUITY_SCORE: 45
ADLS_ACUITY_SCORE: 43
ADLS_ACUITY_SCORE: 49
ADLS_ACUITY_SCORE: 47
ADLS_ACUITY_SCORE: 43
ADLS_ACUITY_SCORE: 46
ADLS_ACUITY_SCORE: 50
ADLS_ACUITY_SCORE: 42
ADLS_ACUITY_SCORE: 47
ADLS_ACUITY_SCORE: 43
ADLS_ACUITY_SCORE: 47
ADLS_ACUITY_SCORE: 46
ADLS_ACUITY_SCORE: 42
ADLS_ACUITY_SCORE: 46
ADLS_ACUITY_SCORE: 46
ADLS_ACUITY_SCORE: 47
ADLS_ACUITY_SCORE: 49
ADLS_ACUITY_SCORE: 48
ADLS_ACUITY_SCORE: 47
ADLS_ACUITY_SCORE: 47
ADLS_ACUITY_SCORE: 46
ADLS_ACUITY_SCORE: 48
ADLS_ACUITY_SCORE: 46
ADLS_ACUITY_SCORE: 48
ADLS_ACUITY_SCORE: 46
ADLS_ACUITY_SCORE: 48
ADLS_ACUITY_SCORE: 43
ADLS_ACUITY_SCORE: 48
ADLS_ACUITY_SCORE: 46
ADLS_ACUITY_SCORE: 42
ADLS_ACUITY_SCORE: 43
ADLS_ACUITY_SCORE: 43

## 2023-08-23 PROBLEM — R65.21 SEPTIC SHOCK (H): Status: ACTIVE | Noted: 2023-01-01

## 2023-08-23 PROBLEM — A41.9 SEPTIC SHOCK (H): Status: ACTIVE | Noted: 2023-01-01
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
## 2023-08-23 NOTE — PROGRESS NOTES
Patient hypotensive this afternoon persistently in 80s. Received po morphine and IV dilaudid due to poorly controlled pain. HR also more elevated at times as high as 120s.   Pt reportedly drowsy but easily aroused.   Plan:  -give 500cc bolus now  --check lactate  --nursing will notify ICU in case low pressures persist and resumption of pressors is needed     Orquidea Siegel PA-C

## 2023-08-23 NOTE — PLAN OF CARE
Goal Outcome Evaluation:    Neuro: A&Ox2-3, disoriented to time and situation. QUEEN weakly, PERRL, sensation intact, lethargic. Speech garbled, unable to complete bedside swallow eval.   CV/VS: SR/ST. Levo now off, 0.05 at max rate. Other VSS.  Respiratory: Tolerating 2L via NC.   GI/: No BM. Incontinent, purewick functioning well.  Skin: R hip with chronic ulcer, scattered abrasions and bruising. Antonio area and skin folds denuded. Probable healed ulcers on buttock, sacrum, antonio area.   Pain: PRN dilaudid x2. Requests pain meds whenever awake but appears comfortable between cares.   Labs: Started K+ and phos protocol, K+ bump x6 ordered and phos x1.  Activity: Bedrest, repositioned Q2H.  Diet: NPO, tolerating swabs.     Possible plan for line placement pending pressor needs.

## 2023-08-23 NOTE — PROGRESS NOTES
Care Management Follow Up    Length of Stay (days): 0    Expected Discharge Date: 08/28/2023     Concerns to be Addressed:       Patient plan of care discussed at interdisciplinary rounds: Yes      Additional Information:  Writer attempted to meet with patient to assess/consult, patient was fast asleep. Writer will attempt again later.     MARVEL Barriga

## 2023-08-23 NOTE — PHARMACY-VANCOMYCIN DOSING SERVICE
"Pharmacy Vancomycin Initial Note  Date of Service 2023  Patient's  1960  62 year old, female    Indication: Community Acquired Pneumonia    Current estimated CrCl = Estimated Creatinine Clearance: 86 mL/min (based on SCr of 0.59 mg/dL).    Creatinine for last 3 days  2023:  1:14 AM Creatinine 0.59 mg/dL    Recent Vancomycin Level(s) for last 3 days  No results found for requested labs within last 3 days.      Vancomycin IV Administrations (past 72 hours)        No vancomycin orders with administrations in past 72 hours.                    Nephrotoxins and other renal medications (From now, onward)      Start     Dose/Rate Route Frequency Ordered Stop    23 0200  piperacillin-tazobactam (ZOSYN) 3.375 g vial to attach to  mL bag        Note to Pharmacy: For [facility], [facility] and [facility]: For Zosyn-naive patients, use the \"Zosyn initial dose + extended infusion\" order panel.    3.375 g  over 30 Minutes Intravenous EVERY 6 HOURS 23 0058      23 0130  norepinephrine (LEVOPHED) 4 mg in  mL PERIPHERAL infusion         0.03-0.125 mcg/kg/min × 62.7 kg  7.1-29.4 mL/hr  Intravenous CONTINUOUS 23 0129      23 0100  vasopressin 0.2 units/mL in NS (PITRESSIN) standard conc infusion         2.4 Units/hr  12 mL/hr  Intravenous CONTINUOUS 23 0050              Contrast Orders - past 72 hours (72h ago, onward)      None            [employer] Prediction of Planned Initial Vancomycin Regimen  Loading dose: 1500 mg @ OSH  Regimen: 1000 mg IV every 12 hours.  Start time: 05:00 on 2023  Exposure target: AUC24 (range)400-600 mg/L.hr   AUC24,ss: 502 mg/L.hr  Probability of AUC24 > 400: 73 %  Ctrough,ss: 14.9 mg/L  Probability of Ctrough,ss > 20: 28 %  Probability of nephrotoxicity (Lodise S[place] ): 10 %      Plan:  Start vancomycin 1000 mg IV q12h.   Vancomycin monitoring method: AUC  Vancomycin therapeutic monitoring goal: 400-600 mg*h/L  Pharmacy will check vancomycin levels " as appropriate in 1-3 Days.    Serum creatinine levels will be ordered daily for the first week of therapy and at least twice weekly for subsequent weeks.      Katelynn Mosley RPH

## 2023-08-23 NOTE — PHARMACY-ADMISSION MEDICATION HISTORY
"Pharmacist Admission Medication History    Admission medication history is complete. The information provided in this note is only as accurate as the sources available at the time of the update.    Medication reconciliation/reorder completed by provider prior to medication history? No    Information Source(s): Patient via in-person    Pertinent Information: Patient has Rn visit 3 times weekly and a \"friend\" who provides her medications.  Patient's responses to medication interview were mostly limited to \"Yes, I take that\".      Morphine, filled 8/9/2023 was to replace Oxycodone (filled 8/3/2023) but patient reports she was alternating between both medications until running out of the Oxycodone.       Morphine initially prescribed at q4h PRN.  Dispensed 60 tablets representing 10 day supply.  Prescriber  attempted to reach patient one day after prescribing to clarify use as 4 times daily [place]<  It is unclear if patient received that message.    Changes made to PTA medication list:  Added: None  Deleted: None  Changed: None           Medication History Completed By: Caleb Muñoz [facility] 8/23/2023 2:36 PM    Prior to Admission medications    Medication Sig Last Dose Taking? Auth Provider Long Term End Date   albuterol (PROAIR HFA/PROVENTIL HFA/VENTOLIN HFA) 108 (90 Base) MCG/ACT inhaler Inhale 1-2 puffs into the lungs every 4 hours (while awake)  at PRN Yes Unknown, Entered By History     gabapentin (NEURONTIN) 300 MG capsule Take 300 mg by mouth 3 times daily  Yes Unknown, Entered By History     hydrOXYzine (VISTARIL) 25 MG capsule Take 50 mg by mouth At Bedtime  Yes Unknown, Entered By History     lidocaine (LIDODERM) 5 % patch Place onto the skin every 24 hours To prevent lidocaine toxicity, patient should be patch free for 12 hrs daily.  Yes Unknown, Entered By History     metoprolol tartrate (LOPRESSOR) 50 MG tablet Take 50 mg by mouth 2 times daily  Yes Unknown, Entered By History Yes    morphine (MSIR) 15 MG IR " tablet Take 15 mg by mouth every 4 hours as needed for pain  Yes Unknown, Entered By History     omeprazole (PRILOSEC) 20 MG DR capsule Take 20 mg by mouth daily  Yes Unknown, Entered By History     oxyCODONE IR (ROXICODONE) 10 MG tablet Take 10 mg by mouth every 6 hours as needed for pain  Yes Unknown, Entered By History     sucralfate (CARAFATE) 1 GM tablet Take 1 g by mouth 4 times daily  Yes Unknown, Entered By History     tiotropium (SPIRIVA) 18 MCG inhaled capsule Inhale 18 mcg into the lungs daily  Yes Unknown, Entered By History Yes

## 2023-08-23 NOTE — PROGRESS NOTES
ICU staff  DOS 8/23/2023    No major overnight events. Off pressors this morning. Sleepy but responds to questions.     Vitals:   Temp:  [98.1  F (36.7  C)-98.5  F (36.9  C)] 98.1  F (36.7  C)  Pulse:  [] 101  Resp:  [10-35] 19  BP: ()/(47-97) 90/47  SpO2:  [94 %-100 %] 97 %     Resp: 19    I/O last 3 completed shifts:  In: 1257.71 [I.V.:1257.71]  Out: 800 [Urine:800]    2L facemask O2    Exam:  Gen: Sleepy but rousable, no apparent distress  HEENT: NC/AT  Pulm: Clear  Cor: RRR  Abdomen/GI: Soft, nondistended, nontender  : Deferred  Extremities: Warm, nonedematous, SCDs in place  Skin: Warm, perfused  Neuro: Rouses to voice, sleepy but answers questions  Psych: Calm    Data:   Labs reviewed  - K 2.5  - Lactate 7 -> 1.5  - Procalcitonin 2.65  - UA (+) small LE  - Bt 1.4  (1.1), AST/ALT 37/150 and trending down  - EtOH 0.048; Utox (+) opiates, THC, tricyclics  - WBCs 24  Imaging results reviewed, films not available  - CT A/P showed left lower lobe multifocal pneumonia in the lower cuts  - CT head, c-spine without acute processes  Nothing on micro yet    Assessment/plan:  62 y.o woman with chronic pain/chronic opiate use, polysubstance use history, complex social situation admitted from OSH with fever, altered mental status, and septic shock likely from pneumonia. Off pressors, lactate cleared.  CNS: Sleepy but rousable.   # Acute/chronic pain  # Toxic/metabolic encephalopathy  # Polysubstance use (EtOH, THC, opiates)  - Continue prn analgesia -- hydromorphone, gabapentin. Can likely transition to baseline PO meds as she becomes more alert.  - Supportive cares  Pulm: Breathing comfortably on supplemental O2  CV: Off pressors since ~0200.  # Hypotension, distributive shock  # Essential hypertension  - Shock resolved, lactate cleared, perfusing well  - Holding antihypertensives for now  GI: Abdomen benign. Should be able to start diet.  # Elevated LFTs, improving  - Follow  : UOP adequate overnight  #  "Hypokalemia  # Hypomagnesemia  - Correcting electrolytes  Heme: Hb 10.2 (11.5), INR 1.45  # Anemia  # Coagulopathy  - No indications for product  Msk: Extremities warm, well-perfused.   Endo: Glucose 112-123.   ID: Afebrile since arrival, nothing on cultures.  # Pneumonia  # ?UTI  - Continue pip/tazo, vancomycin  - Follow on OSH reflex culture  ICU: LMWH, PPI.  - Critical issues have resolved, may be able to transfer later this morning.    Level 2 visit on 8/23/2023.     Delano Varghese MD, PhD  Surgical critical care  8/23/2023, 10:02 AM    Clinically Significant Risk Factors Present on Admission        # Hypokalemia: Lowest K = 2.5 mmol/L in last 2 days, will replace as needed   # Hypocalcemia: Lowest iCa = 4 mg/dL in last 2 days, will monitor and replace as appropriate     # Hypoalbuminemia: Lowest albumin = 2.8 g/dL at 8/23/2023  1:14 AM, will monitor as appropriate  # Coagulation Defect: INR = 1.45 (Ref range: 0.85 - 1.15) and/or PTT = N/A, will monitor for bleeding      # Circulatory Shock: currently requiring pressors for blood pressure support     # Overweight: Estimated body mass index is 25.4 kg/m  as calculated from the following:    Height as of this encounter: 1.575 m (5' 2\").    Weight as of this encounter: 63 kg (138 lb 14.2 oz).           # Anemia: based on hgb <11           "

## 2023-08-23 NOTE — CONSULTS
[facility]  Consult Note - Hospitalist Service  Date of Admission:  8/23/2023  Consult Requested by: Dr. Varghese   Reason for Consult: transfer of care     Assessment & Plan   Melanie Cox is a 62 year old female with a past medical history significant for COPD, HTN, GERD, chronic pain syndrome on opioids, polysubstance abuse, chronic pressure ulcer, HFpEF admitted to ICU on 8/23/2023. She has been weaned off pressors and Hospitalist service requested for transfer of care out of the ICU.     Septic shock, possibly secondary to LLL pneumonia vs UTI  Pt presented to outside ED with fever to 103 and altered mental status.   Initial LA 7, now normalized to 1.5. WBC 25.6>24.6. procal 2.65  CXR reportedly appeared clear, however CT A/P w/o contrast reports LLL multifocal pneumonia. Otherwise no acute intra-abdominal/pelvic process.   UA with 11-25 WBC, small blood, small LE though mod epithelial cells.   Also has chronic R hip wound which does not appears acutely infected  SBP in 70s despite volume resuscitation, started on Norepi which has been off since 0200  BP soft but stable, afebrile since admission   --transfer [facility]   --continue zosyn, add doxycycline for atypical coverage. Stop vanco given neg MRSA screen and low suspicion wound infection   --continue to hold PTA antihypertensives  --blood, urine, and wound cultures from ED in [place] are pending   --continue IVF @100/hr tonight     Acute metabolic encephalopathy   Presents with confusion and decreased LOC.   Etoh level in ED .048   CT head negative for acute findings   Mental status improved 8/23. She is conversant, A&O. Drowsy following narcotics. Neuro intact   --continue to monitor     Alcohol use  Reports periodic drinking at home, denies daily use or hx withdrawal. Etoh level on admission 0.048 as above. No overt withdrawal at this time.   --monitor clinically  --po vitamins     Elevated LFTs  ? hypoperfusion from sepsis.  Trending down though t.bili 1.1>1.4  She is s/p cholecystectomy   --repeat hepatic panel in am     Chronic R hip wound   Follows with general surgery after prior surgical intervention and treatment with wound vac. Last seen 7/31.   Wound does not appears acutely infected   --WOC consult    Chronic pain syndrome  DDD  Per chart review some concern for misuse.   Recently switched from oxycodone to morphine IR at appt 8/9.   PTA: morphine 15mg q4 hours four times daily, however has been taking more like q4 and also taking prior oxy until it runs out  --continue PTA morphine as prescribed  --IV dilaudid available prn, hold for sedation   --on gabapentin PTA but tells me she is not taking this     COPD with ongoing tobacco use   No home oxygen use. No significant wheezing on exam. Declines nicotine replacement  --duonebs prn  --resume PTA spiriva     HTN  Holding PTA metoprolol given ongoing soft pressures     Chronic HFpEF  ECHO 3/2022 shows EF 73% with normal RV function   BNP 8/22 in ED elevated at 10,700, not clinically volume up.   No diuretic PTA   --follow I&O, daily weights  --monitor volume status closely with ongoing IVF     GERD  Continue PPI when verified     Vulnerable adult   Per report arrived to ED covered in stool. Chart review indicates prior admission after laying on the floor for several days, concern for unsafe living situation and hx multiple vulnerable adult reports filed.   --SW to see     FEN  Severe hypokalemia resolved   Ca 7.4, ionized 4.   K on admission here 2.5>normalized to 3.8 with replacement  Mag 2  --on regular diet  --continue IVF @ 100  --give 1g calcium     The patient's care was discussed with Dr. Valdes who agrees with the above plan     Clinically Significant Risk Factors Present on Admission        # Hypokalemia: Lowest K = 2.5 mmol/L in last 2 days, will replace as needed   # Hypocalcemia: Lowest iCa = 4 mg/dL in last 2 days, will monitor and replace as appropriate     #  "Hypoalbuminemia: Lowest albumin = 2.8 g/dL at 8/23/2023  1:14 AM, will monitor as appropriate  # Coagulation Defect: INR = 1.45 (Ref range: 0.85 - 1.15) and/or PTT = N/A, will monitor for bleeding      # Circulatory Shock: currently requiring pressors for blood pressure support     # Overweight: Estimated body mass index is 25.4 kg/m  as calculated from the following:    Height as of this encounter: 1.575 m (5' 2\").    Weight as of this encounter: 63 kg (138 lb 14.2 oz).              Orquidea Siegel PA-C  Hospitalist Service  Securely message with [employer] (more info)  Text page via [facility] Paging/Directory   ______________________________________________________________________    Chief Complaint   AMS, sepsis     History is obtained from the patient    History of Present Illness   Melanie Cox is a 62 year old female with a past medical history significant for COPD, HTN, GERD, chronic pain syndrome on opioids, polysubstance abuse, chronic pressure ulcer, HFpEF admitted to ICU on 8/23/2023. She has been weaned off pressors and Hospitalist service requested for transfer of care out of the ICU.   Patient was brought to ED in after significant other called EMS for altered mental status, fever, and confusion. Patient reports she has been feeling sick with fevers or \"a week or two\". Also reports cough and diarrhea at home. In the ED she was hypotensive with lactate of 7. She was given IVF with some improvement but ultimately required peripheral vasopressor support. CT abdomen neg for abdominal findings but showed multifocal LLL pneumonia.Patient initiated on broad spectrum abx and transferred to [facility] ICU.  She is presently evaluated in her room in the ICU. Her main complain in bilateral shoulder pain and back pain at this time. Also reports pain in lower sternum with deep breath and is tender to palpation in this area. Reports she has been taking morphine at home every 4 hours or so and does not take anything else for " "pain. Denies dysuria but reports urine has felt \"hot.\" Diarrhea seems to have resolved. Denies recent changes in her wound.       Past Medical History    Past Medical History:   Diagnosis Date    Chronic pain syndrome     On chronic narcotics.    COPD (chronic obstructive pulmonary disease) (H)     DDD (degenerative disc disease), lumbar     Gastroesophageal reflux disease without esophagitis     Heart failure with preserved ejection fraction, NYHA class I (H)     High risk social situation     H/o EtOH abuse along with her significant other    HTN (hypertension)     Pressure ulcer, hip     Due to lying on floor continuously       Past Surgical History   Past Surgical History:   Procedure Laterality Date    LUMBAR FUSION         Medications   Medications Prior to Admission   Medication Sig Dispense Refill Last Dose    albuterol (PROAIR HFA/PROVENTIL HFA/VENTOLIN HFA) 108 (90 Base) MCG/ACT inhaler Inhale 1-2 puffs into the lungs every 4 hours (while awake)    at PRN    gabapentin (NEURONTIN) 300 MG capsule Take 300 mg by mouth 3 times daily       hydrOXYzine (VISTARIL) 25 MG capsule Take 50 mg by mouth At Bedtime       lidocaine (LIDODERM) 5 % patch Place onto the skin every 24 hours To prevent lidocaine toxicity, patient should be patch free for 12 hrs daily.       metoprolol tartrate (LOPRESSOR) 50 MG tablet Take 50 mg by mouth 2 times daily       morphine (MSIR) 15 MG IR tablet Take 15 mg by mouth every 4 hours as needed for pain       omeprazole (PRILOSEC) 20 MG DR capsule Take 20 mg by mouth daily       sucralfate (CARAFATE) 1 GM tablet Take 1 g by mouth 4 times daily       tiotropium (SPIRIVA) 18 MCG inhaled capsule Inhale 18 mcg into the lungs daily       oxyCODONE IR (ROXICODONE) 10 MG tablet Take 10 mg by mouth every 6 hours as needed for pain    at Now Morphine          Social History   I have reviewed this patient's social history and updated it with pertinent information if needed.  Social History "     Tobacco Use    Smoking status: Every Day     Types: Cigarettes        Physical Exam   Temp: 97.9  F (36.6  C) Temp src: Oral BP: 110/72 Pulse: 101   Resp: 24 SpO2: 94 % O2 Device: None (Room air) Oxygen Delivery: 2 LPM  Vitals:    08/23/23 0030 08/23/23 0400   Weight: 62.7 kg (138 lb 3.7 oz) 63 kg (138 lb 14.2 oz)     Vital Signs with Ranges  Temp:  [97.9  F (36.6  C)-98.5  F (36.9  C)] 97.9  F (36.6  C)  Pulse:  [] 101  Resp:  [10-35] 24  BP: ()/(35-97) 110/72  SpO2:  [92 %-100 %] 94 %  I/O last 3 completed shifts:  In: 1257.71 [I.V.:1257.71]  Out: 800 [Urine:800]    Constitutional: Alert, oriented x4 appears uncomfortable at times secondary to pain. Appears older than stated age   ENT: moist mucous membranes. Poor dentition.   Respiratory: Lungs clear to auscultation bilaterally, no increased work of breathing. Occasional congested cough   Cardiovascular: Regular rhythm with tachycardia, no murmur.  Extremities:  no LE edema   GI: positive bowel sounds, abdomen soft, non-distended. Mild tenderness LUQ without guarding or rigidity.   Skin/Integumen: intertrigo abdominal folds. Wound R hip without significant erythema or drainage   MSK:  moves all four extremities.  strength 5/5 and equal. Able to raise both arms but limited shoulder ROM bilaterally (she says is chronic) LE strength testing difficult due to pain   Neuro:  speech is clear. Face symmetric. Tongue midline       Medical Decision Making       70 MINUTES SPENT BY ME on the date of service doing chart review, history, exam, documentation & further activities per the note.      Data     I have personally reviewed the following data over the past 24 hrs:    24.6 (H)  \   10.2 (L)   / 195     134 (L) 102 12.7 /  112 (H)   3.8 17 (L) 0.59 \     ALT: 150 (H) AST: 37 AP: 162 (H) TBILI: 1.4 (H)   ALB: 2.8 (L) TOT PROTEIN: 5.9 (L) LIPASE: 8 (L)     Procal: 2.65 (H) CRP: N/A Lactic Acid: 1.5       INR:  1.45 (H) PTT:  N/A   D-dimer:  N/A  Fibrinogen:  N/A       Imaging results reviewed over the past 24 hrs:   Recent Results (from the past 24 hour(s))   CT Head w/o Contrast    Narrative    For Patients:  As a result of the 21st Century Cures Act, medical imaging exams and procedure reports are released immediately into your electronic medical record.  You may view this report before your referring provider.  If you have questions, please contact your health care provider.      Indication :  Altered mental status.    Technique :  CT of the brain without intravenous contrast.    Comparison:  CT head 06/19/2023    Findings:  Mild motion artifact.     No acute blurring of the gray-white differentiation. There is no intracranial hemorrhage.     The ventricles are proportionate to the cerebral sulci. The 4th ventricle is midline. Basal cisterns appear patent. No abnormal extra-axial fluid collection identified.     Mild parenchymal volume loss. There is mild patchy periventricular hypodensity, favored to represent chronic ischemic microvascular disease.     There is no intracranial mass, mass effect or midline shift identified.     No depressed calvarial fracture.     Impression:  1. No acute intracranial process.  2. Mild chronic ischemic microvascular disease.      Please note that all CT scans at this facility use dose modulation, iterative reconstruction, and/or weight-based dosing when appropriate to reduce radiation dose to as low as reasonably achievable.    Dictated by Per Tapia MD @ 8/22/2023 6:04:16 PM    (Electronically Signed)   CT Cervical Spine w/o Contrast    Narrative    For Patients:  As a result of the 21st Century Cures Act, medical imaging exams and procedure reports are released immediately into your electronic medical record.  You may view this report before your referring provider.  If you have questions, please contact your health care provider.      Indication:  Confusion. Possible fall.    Technique:  CT of the cervical  spine performed without IV contrast.      Comparison:  CT cervical spine 06/19/2023.    Findings:  Images are severely degraded by patient motion.    No gross appearing fracture or traumatic malalignment moderate multilevel disc height loss and desiccation. Grade 1 anterolisthesis C3 on C4. Similar appearing superior endplate deformity of T2.     Moderate multilevel spondylosis with varying degrees spinal canal or neural foraminal stenosis.     Bilateral pleural apical thickening small calcifications. No prevertebral soft tissue swelling.    Impression:    1. Images are severely degraded by motion artifact.  2. No definite fracture or traumatic malalignment.   3. Moderate multilevel spondylosis.      Please note that all CT scans at this facility use dose modulation, iterative reconstruction, and/or weight-based dosing when appropriate to reduce radiation dose to as low as reasonably achievable.    Dictated by Per Tapia MD @ 8/22/2023 6:07:56 PM    (Electronically Signed)   CT ABDOMEN PELVIS STONE PROTOCOL WO    Narrative    For Patients:  As a result of the 21st Century Cures Act, medical imaging exams and procedure reports are released immediately into your electronic medical record.  You may view this report before your referring provider.  If you have questions, please contact your health care provider.      INDICATION:  Abdominal pain, confusion.    TECHNIQUE:  CT abdomen and pelvis without contrast.    COMPARISON:  June 19, 2023.     FINDINGS:  Lower chest: Multifocal left lower lobe pneumonia. Moderate hiatal hernia.     Liver: Normal in size and attenuation. No suspicious masses.     Gallbladder and bile ducts: Cholecystectomy with reservoir effect.     Pancreas: Unremarkable. No mass or inflammation.     Spleen: Splenic granulomas. Normal in size. No masses.     Adrenal glands: Normal in size. No nodules.     Kidneys: Normal in size. No suspicious masses, stones, or hydronephrosis.     GI tract: No  bowel obstruction.     Vasculature: Moderate aortoiliac arterial calcifications. Abdominal aorta is normal in caliber.     Lymph nodes: No lymphadenopathy.     Peritoneum/Abdominal Wall: Unremarkable. No sign of mass or infiltration. No free air or significant free fluid.     Pelvis: Limited evaluation secondary to streak artifact. Moderately distended bladder. Hysterectomy.     Bones: L2-S1 posterior hardware fixation causing streak artifact which limits evaluation. Stable multilevel compression deformities.     Impression    Limited evaluation, particularly of the bowel, secondary to motion artifact.     Multifocal left lower lobe pneumonia.     No acute intra-abdominal/pelvic abnormality or significant interval change when compared to prior study.     Chronic findings as above.      Please note that all CT scans at this facility use dose modulation, iterative reconstruction, and/or weight-based dosing when appropriate to reduce radiation dose to as low as reasonably achievable.    Dictated by Renny York MD @ 8/22/2023 6:29:39 PM    (Electronically Signed)   XR CHEST 1 VIEW PORTABLE    Narrative    For Patients:  As a result of the 21st Century Cures Act, medical imaging exams and procedure reports are released immediately into your electronic medical record.  You may view this report before your referring provider.  If you have questions, please contact your health care provider.      INDICATIONS:  Confusion.    TECHNIQUE:  Chest 1 AP view.    COMPARISON:  Chest radiograph 06/05/2023.    FINDINGS:  Patient is rotated to the left. No evidence of pneumothorax or pleural effusion. Calcified granuloma in the right lower lung zone, as before. Lungs are otherwise clear. Cardiac and mediastinal contours are stable allowing for differences in imaging technique. Unchanged elevation of the right hemidiaphragm. Upper abdomen and osseous structures as imaged show no acute abnormality.    Impression    No evidence of  acute cardiopulmonary disease.    Dictated by Murray Bush MD @ 8/22/2023 6:11:34 PM        Dictated by: Murray Bush MD @ 08/22/2023 18:11:40    (Electronically Signed)

## 2023-08-23 NOTE — CONSULTS
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  Small opening with undermining/ tunneling of 2-3cm.  No acute s/s infection.  Providers assessed with WOC at bedside, no acute need for surgical consult but may pursue imaging as pt progresses.  WOC will treat topically with Vashe-moist Mesalt packing.      Patient History (according to provider note(s):      Melanie Cox is a 62 year old lady with substance abuse, chronic back pain s/p fusion on narcotics, h/o opiate overdose, GERD, COPD, H/o Rt. LL pna, H/o HFpEF and chronic Rt hip wound (h/o pressure ulcers). She presented to ED in [facility] with Fevers/Altered mental status. She has septic shock due to left LL pna. My assessment and plan for this patient is as follows:     Areas Assessed:      Areas visualized during today's visit: Sacrum/coccyx and right hip    Wound location: Right hip    Last photo: 8-23-23        Wound due to: Pressure Injury per report  Wound history/plan of care: unclear, pt states will be 2 years this December, has a wound care friend who packs a 'shoelace' dressing into wound 3x week; per chart review has been followed in [facility] in [facility]  Wound base: what is visible is light pink moist tissue     Palpation of the wound bed: normal and firm      Drainage: small     Description of drainage: serosanguinous     Measurements (length x width x depth, in cm): opening approx 1.2 x 0.4 x 0.5cm     Tunneling vs undermining from approx 1-6 o'clock, 2.5+cm   Periwound skin: Rashy erythema/ MARSI and pink scar tissue      Color: red      Temperature: normal   Odor: none  Pain: moderate, sharp and tender with palpation  Pain interventions prior to dressing change: slow and gentle cares   Treatment goal: Drainage control, Infection control/prevention, Maintain (prevention of deterioration), and Protection  STATUS:  "initial assessment and stable  Supplies ordered: ordered from        8-23-23 coccyx, buttocks - intact, with resurfacing superficial friction/abrasion vs old PI left buttock      Treatment Plan:     Right hip wound(s): Daily and prn:  Cleanse wound and surrounding skin with Vashe-moist gauze, then let dry.  Swab out wound with qtip, identifying the depth and direction of the undermining (where you will need to pack).   Swab periwound with no-sting skin barrier film, let dry.  Pack wound with Vashe-moistened Mesalt ribbon (# [id number]), using wooden end of qtip.  Leave a small tail sticking out for easy removal.   Cover with Mepilex 4x4 or gauze and Medipore tape.  PIP measures.     Pressure Injury Prevention (PIP) Plan:  -If patient is declining pressure injury prevention interventions: Explore reason why and address patient's concerns, Educate on pressure injury risk and prevention intervention(s), If patient is still declining, document \"informed refusal\" , and Ensure Care team is aware ( provider, charge nurse, etc)  -Mattress: Follow bed algorithm, reassess daily and order specialty mattress, if indicated.  -HOB: Maintain at or below 30 degrees, unless contraindicated  -Repositioning in bed: Every 1-2 hours , Left/right positioning; avoid supine, and Raise foot of bed prior to raising head of bed, to reduce patient sliding down (shear)  -Heels: Keep elevated off mattress and Pillows under calves  -Protective Dressing: Sacral Mepilex for prevention (#[id number]),  especially for the agitated patient   -Positioning Equipment: TAPS wedges (#[id number]) to help maintain 30 degree side lying position   -Chair positioning: Assist patient to reposition hourly   -Moisture Management: Perineal cleansing /protection: Follow Incontinence Protocol, Avoid brief in bed, and Clean and dry skin folds with bathing   -Under Devices: Inspect skin under all medical devices during skin inspection , Ensure tubes are stabilized without " tension, and Ensure patient is not lying on medical devices or equipment when repositioned        Orders: Written    RECOMMEND PRIMARY TEAM ORDER: None, at this time  Education provided: plan of care  Discussed plan of care with: Patient, Nurse, and Physician  WO nurse follow-up plan: weekly and prn  Notify WO if wound(s) deteriorate.  Nursing to notify the Provider(s) and re-consult the WO Nurse if new skin concern.    DATA:     Current support surface: Standard  Low air loss (MARIO ALBERTO pump, Isolibrium, Pulsate, skin guard, etc) Isolibrium in ICU  Containment of urine/stool: Incontinence Protocol and Purewick external catheter   BMI: Body mass index is 25.4 kg/m .   Active diet order: Orders Placed This Encounter      Clear Liquid Diet     Output: I/O last 3 completed shifts:  In: 1257.71 [I.V.:1257.71]  Out: 800 [Urine:800]     Labs:   Recent Labs   Lab 08/23/23  0114   ALBUMIN 2.8*   HGB 10.2*   INR 1.45*   WBC 24.6*     Pressure injury risk assessment:   Sensory Perception: 3-->slightly limited  Moisture: 3-->occasionally moist  Activity: 1-->bedfast  Mobility: 2-->very limited  Nutrition: 2-->probably inadequate  Friction and Shear: 2-->potential problem  Dheeraj Score: 13    Wilda Winslow RN CWOCN  -Securely message with [employer] ([facility] [employer])   -[facility] Office Phone: [phone] (messages checked periodically Mon-Fri 8a-4p)

## 2023-08-24 NOTE — PROGRESS NOTES
Patient was in tears asking for pain meds, so this RN brought in PRN Morphine 15mg tab. Pt refused Morphine & began to cry even harder asking why she could not have Dilaudid. RN spoke with Jeferson, pt's primary nurse who then gave the OK to give 0.5 mg IV Dilaudid instead. Jeferson & this RN entered pt's room together, witnessing the administration of IV Dilaudid. About a minute after Jeferson left the pt's room, pt asked this RN when she was going to give her some pain meds. RN explained to pt that she just got the 0.5 mg of IV Dilaudid to which pt told this RN that she was lying & never got any pain meds. Primary nurse notified of instance.

## 2023-08-24 NOTE — PROGRESS NOTES
Patient was transferred from ICU.  She is AO X 4; forgetful; following commands.  I removed Purewick because of the injury to her labia.  VSS; on 2 LPM supplemental oxygen.        Progress Notes by Marco Ricci MD at 07/30/18 07:36 AM     Author:  Marco Ricci MD Service:  (none) Author Type:  Physician     Filed:  07/30/18 07:36 AM Encounter Date:  7/30/2018 Status:  Signed     :  Marco Ricci MD (Physician)            Apt 8/1[WD1.1M]    Revision History        User Key Date/Time User Provider Type Action    > WD1.1 07/30/18 07:36 AM Marco Ricci MD Physician Sign    M - Manual

## 2023-08-24 NOTE — PLAN OF CARE
Neuro: Disoriented to situation. Alert/lethargic. Otherwise intact. Moans in pain with any touching or movement.     CV: BP adewuate, norepi off since 9pm. -105.     Resp: Productive cough, sputum sent. 2 L NC while asleep.      GI: No BM. Reg diet.      : external cath working well. Good UOP.      Skin: No new deficits noted.    Lines: IV access lost. Flyer attempting PIV, suggesting PICC.    Vascular access consulted for PIV, will need labs drawn.   Cimzia Counseling:  I discussed with the patient the risks of Cimzia including but not limited to immunosuppression, allergic reactions and infections.  The patient understands that monitoring is required including a PPD at baseline and must alert us or the primary physician if symptoms of infection or other concerning signs are noted.

## 2023-08-24 NOTE — PROGRESS NOTES
"[facility]    Internal Medicine Hospitalist Progress Note  08/24/2023  I evaluated patient on the above date.    Jose Raul Noriega Jr., MD  [phone] (p)  Text Page  Vocera        Assessment & Plan New actions/orders today (08/24/2023) are underlined. All lab results in the assessment and plan were reviewed.    Melanie Cox is a 62 year old female with a past medical history significant for COPD; HTN; CHF (HFpEF); chronic spine/disc disease with chronic pain syndrome on opioids; polysubstance abuse; chronic pressure ulcer; and GERD; who presented to OSH 8/22/2023 with fevers with AMS and found to be in septic shock with signs of LLL pneumonia. Transferred to [facility] ICU 8/22/2023 for management.    On initially evaluation (per ICU H&P): \"Was noted to be hypotensive with SBP in 70's and HR in 140's. She was given 1L IVF and SBP improved to 140's and HR to 100's. It again started dropping with SBP in 80's and was given addl 1L IVF. She had a temp of 103.3F.  Her lactic acid improved from 7 to 3. Her BMP shows low Bicarb and VBG is well compensated. Her CT head/CT spine was neg. CXR was normal per report. CT abd interesting showed left LL pna. She was started on Zosyn.\"    Transferred to [facility] ICU 8/22/2023. Pt was treated with antibiotics and pressors and subsequently improved. Pressors were weaned off and pt was transferred to the IM service 8/23/2023.      LLL pneumonia with septic shock and acute hypoxic respiratory failure.  COPD with ongoing tobacco use.  Possible UTI.  * Initial presentation to OSH as above. Also had UA with 11-25 WBC, small blood, small LE though mod epithelial cells. Blood,   * Transferred to [facility] ICU 8/22. Started on vanco, pipericillin-tazobactam and doxycycline on admit. MRSA nasal PCR negative. BC's  NGTD.   * On 8/23, weaned off pressors and transferred to IM team. Vanco stopped. Later in the afternoon, became hypotensive again requiring more fluids and " pressors, weaned off in the evening.  * On 8/24, BP's improved, still tachycardic. Sats stable on 1-2L O2.  Recent Labs   Lab 08/23/23  1641 08/23/23  0114   WBC  --  24.6*   LACT 1.4 1.5   PCAL  --  2.65*   - Continue O2, wean as able.  - Continue IVF's.  - Continue pipericillin-tazobactam (started 8/23) and doxycycline (started 8/23).  - Continue umeclidinium and PRN ipratropium-albuterol nebs   - Continue to monitor cultures.    Acute septic, metabolic and toxic encephalopathy.  * Initial presentation to OSH as above. Presented with confusion and decreased LOC. EtOH level in ED .048. CT head negative for acute findings.  * On 8/23, mental status improved. She was conversant, A&O. Drowsy following opioids. Neuro intact   - Re-orient as needed.  - Maintain normal day/night, sleep/wake cycles.  - Minimize sedating medications as able.    Alcohol use.  * On 8/23, pt reported periodic drinking at home, denied daily use or hx withdrawal. Etoh level on admission 0.048 as above. No overt withdrawal noted.  - Continue to monitor clinically.    Elevated LFTs, unclear etiology, question hypoperfusion from sepsis.  * Pt is s/p cholecystectomy.  * LFT's 8/23 showed tbili 1.4, , AST 37, , albumin 2.8, INR 1.45. N  Recent Labs   Lab 08/23/23  0114   ALBUMIN 2.8*   BILITOTAL 1.4*   *   AST 37   ALKPHOS 162*   LIPASE 8*   AMYLASE 22*   INR 1.45*   CR 0.59   - Continue to treat other issues as noted.  - Continue to monitor LFT's - repeat in am.    Chronic R hip wound.  * Follows with general surgery after prior surgical intervention and treatment with wound vac. Last seen 7/31/2023.  * On admit, wound did not appears acutely infected.  * WOC consulted 8/23.  - Continue local wound cares per WOC RN.    Chronic pain syndrome.  DDD.  * Per chart review, there was some concern for misuse. Recently switched from oxycodone to morphine IR at appt 8/9. PTA: morphine 15mg q4 hours four times daily, however had been  taking more like q4 and also taking prior oxycodone until it runs out. PTA also on lidocaine patch. Gabapentin on med list but pt not taking.  - Continue lidocaine 4% patch q24h; PRN morphine 15 mg q4h; PRN IV hydromorphone; minimize opioids as able.    H/o hypertension (benign essential).  Chronic CHF (HFpEF).  [PTA: metoprolol 50 mg BID.]  * ECHO 3/2022 showed LV EF 73% with normal RV function.   * BNP 8/22 in ED elevated at 10,700, not clinically volume up.   * Metoprolol held on admit.  - Continue to hold metoprolol.  - Continue to monitor i/o's, daily wts.    Anemia, suspect dilutional and/or chronic.  * Hgb 10.2 on admit 8/23. No overt clinical signs of major bleeding.  Recent Labs   Lab 08/23/23 0114   HGB 10.2*   - Continue to monitor CBC - repeat in am.  - Consider prbc transfusion if hgb </= 7.0 or if significant bleeding with hemodynamic instability or if symptomatic.    Coagulopathy, suspect due to decreased PO intake.  * INR 1.45 on admit 8/23.  Recent Labs   Lab 08/23/23 0114   INR 1.45*   - Repeat in am.    GERD  - Continue pantoprazole and sucralfate.    Vulnerable adult.  * Per report, arrived to ED covered in stool. Chart review indicated prior admission after laying on the floor for several days, concern for unsafe living situation and hx multiple vulnerable adult reports filed.   - SW to see.    Hypokalemia, hypophosphatemia.  * Potassium and phosphorus low at times this hospitalization. Treated with replacement.  Recent Labs   Lab 08/23/23  1125 08/23/23 0114   POTASSIUM 3.8 2.5*   MAG  --  2.0   PHOS 2.8 2.3*   - Continue PRN K and phos replacement.    Hypocalcemia.  * Given IV calcium 8/23.  Recent Labs   Lab 08/23/23 0114   IAN 7.4*   ICAW 4.0*   - Continue to monitor calcium.      Clinically Significant Risk Factors        # Hypokalemia: Lowest K = 2.5 mmol/L in last 2 days, will replace as needed   # Hypocalcemia: Lowest iCa = 4 mg/dL in last 2 days, will monitor and replace as  "appropriate     # Hypoalbuminemia: Lowest albumin = 2.8 g/dL at 8/23/2023  1:14 AM, will monitor as appropriate  # Coagulation Defect: INR = 1.45 (Ref range: 0.85 - 1.15) and/or PTT = N/A, will monitor for bleeding           # Overweight: Estimated body mass index is 27.22 kg/m  as calculated from the following:    Height as of this encounter: 1.575 m (5' 2\").    Weight as of this encounter: 67.5 kg (148 lb 13 oz)., PRESENT ON ADMISSION              COVID-19 testing.  COVID-19 PCR Results           No data to display              COVID-19 Antibody Results, Testing for Immunity           No data to display                Diet: Regular Diet Adult      Prophylaxis: PCD's, ambulation. Enoxaparin.  Leal Catheter:   Not present  Lines:   None     Code Status: Full Code    Disposition Plan   Expected discharge: 1-2d recommended to prior living arrangement pending  above .  Entered: Jose Raul Noriega MD 08/24/2023, 7:20 AM       - Transfer out of ICU if BP's remain stable.    Interval History   On pressors again last night, low BP's felt related to pain meds.  BP's better this am.  Pt anxious regarding midline placement.    -Data reviewed today: I reviewed all new labs and imaging over the last 24 hours. I personally reviewed no images or EKG's today.    Physical Exam    ,   Blood pressure 114/63, pulse 108, temperature 99.6  F (37.6  C), temperature source Oral, resp. rate 20, height 1.575 m (5' 2\"), weight 67.5 kg (148 lb 13 oz), SpO2 94 %. O2 Device: Nasal cannula Oxygen Delivery: 2 LPM  Vitals:    08/23/23 0030 08/23/23 0400 08/24/23 0400   Weight: 62.7 kg (138 lb 3.7 oz) 63 kg (138 lb 14.2 oz) 67.5 kg (148 lb 13 oz)     Vital Signs with Ranges  Temp:  [97.9  F (36.6  C)-100.4  F (38  C)] 99.6  F (37.6  C)  Pulse:  [] 108  Resp:  [10-30] 20  BP: ()/(35-95) 114/63  SpO2:  [87 %-99 %] 94 %  Patient Vitals for the past 24 hrs:   BP Temp Temp src Pulse Resp SpO2 Weight   08/24/23 0600 114/63 -- -- 108 20 94 " % --   08/24/23 0500 106/80 -- -- 102 11 92 % --   08/24/23 0400 105/69 99.6  F (37.6  C) Oral 100 10 97 % 67.5 kg (148 lb 13 oz)   08/24/23 0300 102/70 -- -- 108 10 97 % --   08/24/23 0200 98/68 -- -- 109 11 96 % --   08/24/23 0100 99/70 -- -- 111 11 92 % --   08/24/23 0035 -- -- -- -- 16 -- --   08/24/23 0000 108/68 98.8  F (37.1  C) Oral 105 13 94 % --   08/23/23 2330 111/62 -- -- 100 11 95 % --   08/23/23 2300 108/65 -- -- 100 12 91 % --   08/23/23 2230 100/65 -- -- 109 13 93 % --   08/23/23 2200 98/59 -- -- 103 11 99 % --   08/23/23 2145 90/62 -- -- 104 11 99 % --   08/23/23 2130 98/59 -- -- 108 11 97 % --   08/23/23 2115 101/64 -- -- 111 14 90 % --   08/23/23 2100 93/67 -- -- 109 11 98 % --   08/23/23 2045 108/69 -- -- 107 20 (!) 87 % --   08/23/23 2030 106/64 -- -- 106 14 90 % --   08/23/23 2022 -- -- -- -- 16 -- --   08/23/23 2015 109/85 -- -- 107 16 92 % --   08/23/23 2000 115/72 100.4  F (38  C) Oral 107 13 95 % --   08/23/23 1930 110/54 -- -- 101 12 95 % --   08/23/23 1900 118/69 -- -- 105 12 96 % --   08/23/23 1730 120/70 98  F (36.7  C) Oral 111 14 90 % --   08/23/23 1715 (!) 104/94 -- -- 103 14 91 % --   08/23/23 1700 106/87 -- -- 105 14 96 % --   08/23/23 1645 (!) 80/59 -- -- 104 30 92 % --   08/23/23 1633 (!) 119/95 -- -- 109 15 95 % --   08/23/23 1630 (!) 119/95 -- -- 108 16 95 % --   08/23/23 1615 (!) 79/50 -- -- 105 22 95 % --   08/23/23 1600 (!) 70/46 -- -- 101 11 96 % --   08/23/23 1545 (!) 77/42 -- -- 105 12 96 % --   08/23/23 1530 (!) 82/45 -- -- 110 14 97 % --   08/23/23 1520 (!) 82/47 -- -- 108 16 92 % --   08/23/23 1500 (!) 87/55 -- -- 120 18 -- --   08/23/23 1430 (!) 84/56 -- -- 115 15 -- --   08/23/23 1400 116/45 -- -- 105 14 -- --   08/23/23 1300 110/72 -- -- 101 24 -- --   08/23/23 1230 105/60 -- -- 103 27 94 % --   08/23/23 1200 104/89 97.9  F (36.6  C) Oral 101 19 97 % --   08/23/23 1145 91/54 -- -- 102 15 97 % --   08/23/23 1130 95/55 -- -- 99 15 96 % --   08/23/23 1115 96/63 -- --  108 14 94 % --   08/23/23 1100 102/51 -- -- 105 15 95 % --   08/23/23 1045 94/70 -- -- 104 13 94 % --   08/23/23 1030 95/62 -- -- 103 14 92 % --   08/23/23 1015 97/54 -- -- 103 14 92 % --   08/23/23 1000 93/63 -- -- 98 16 94 % --   08/23/23 0945 (!) 82/35 -- -- 93 19 98 % --   08/23/23 0940 (!) 82/35 -- -- 100 19 97 % --   08/23/23 0930 98/58 -- -- 105 16 97 % --   08/23/23 0915 101/62 -- -- 105 19 99 % --   08/23/23 0900 102/48 -- -- 105 16 99 % --   08/23/23 0845 96/56 -- -- 102 14 99 % --   08/23/23 0830 (!) 88/48 -- -- 101 19 98 % --   08/23/23 0815 90/47 -- -- 101 19 97 % --   08/23/23 0800 90/57 -- -- 100 13 98 % --   08/23/23 0745 98/49 -- -- 101 21 98 % --   08/23/23 0730 111/61 -- -- 103 27 97 % --     I/O's Last 24 hours  I/O last 3 completed shifts:  In: 4437.05 [P.O.:240; I.V.:3697.05; IV Piggyback:500]  Out: 1400 [Urine:1400]    Constitutional: Awake, alert, anxious.  Respiratory: Diminished in bases. No crackles or wheezes.  Cardiovascular: RRR, no m/r/g.  GI:   Skin/Integumen: No significant bilateral lower extremity edema.  Other:        Data    Labs reviewed.  Recent Labs   Lab 08/23/23  1125 08/23/23  0414 08/23/23  0114 08/23/23  0028   WBC  --   --  24.6*  --    HGB  --   --  10.2*  --    MCV  --   --  87  --    PLT  --   --  195  --    INR  --   --  1.45*  --    NA  --   --  134*  --    POTASSIUM 3.8  --  2.5*  --    CHLORIDE  --   --  102  --    CO2  --   --  17*  --    BUN  --   --  12.7  --    CR  --   --  0.59  --    ANIONGAP  --   --  15  --    IAN  --   --  7.4*  --    GLC  --  112* 123* 93   ALBUMIN  --   --  2.8*  --    PROTTOTAL  --   --  5.9*  --    BILITOTAL  --   --  1.4*  --    ALKPHOS  --   --  162*  --    ALT  --   --  150*  --    AST  --   --  37  --    LIPASE  --   --  8*  --      No lab results found.  Recent Labs   Lab 08/23/23  0414 08/23/23  0114 08/23/23  0028   * 123* 93      No lab results found.     Recent Labs   Lab 08/23/23  0114   INR 1.45*     Recent Labs    Lab 08/23/23  1641 08/23/23  0114   WBC  --  24.6*   LACT 1.4 1.5   PCAL  --  2.65*       MICRO:  CULTURES (INCLUDING BLOOD AND URINE):  Recent Labs   Lab 08/23/23 2048 08/23/23  0240   CULTURE >10 Squamous epithelial cells/low power field indicates oral contamination. Please recollect. No growth after 1 day  No growth after 1 day       No results found for this or any previous visit (from the past 24 hour(s)).    Medications   All medications were reviewed.    Infusions:   lactated ringers Stopped (08/24/23 0600)    norepinephrine Stopped (08/23/23 2045)    sodium chloride Stopped (08/24/23 0600)     Scheduled Medications:   doxycycline  100 mg Intravenous Q12H    enoxaparin ANTICOAGULANT  40 mg Subcutaneous Q24H    folic acid  1 mg Oral Daily    Lidocaine  1 patch Transdermal Q24H    multivitamin, therapeutic  1 tablet Oral Daily    nystatin   Topical BID    pantoprazole  40 mg Oral QAM AC    piperacillin-tazobactam  3.375 g Intravenous Q6H    sucralfate  1 g Oral 4x Daily    vitamin B1  100 mg Oral Daily    umeclidinium  1 puff Inhalation Daily     PRN Medications:  albuterol, glucose **OR** dextrose **OR** glucagon, HYDROmorphone, ipratropium - albuterol 0.5 mg/2.5 mg/3 mL, morphine, naloxone **OR** naloxone **OR** naloxone **OR** naloxone

## 2023-08-24 NOTE — PLAN OF CARE
A/O x4; forgetful. VSS on 3L NC. Tele ST. Pain managed w/ prn morphine and dilaudid. LS course in RLL. Productive congested cough. +BS, -BM. Chronic R hip wound, CDI. Abdominal redness, nystatin applied. Tolerating regular diet, poor appetite this morning. Purewick in place, voiding adequately. Midline placed. P and K replacement ordered per protocol. Report given to Jeferson SANCHEZ, pt transferred to [facility].

## 2023-08-24 NOTE — PROCEDURES
[facility]    Single Lumen Midline Placement    Date/Time: 8/24/2023 9:15 AM    Performed by: Dontrell Edward RN  Authorized by: Onesimo Pardo MD  Indications: vascular access      UNIVERSAL PROTOCOL   Site Marked: Yes  Prior Images Obtained and Reviewed:  NA  Required items: Required blood products, implants, devices and special equipment available    Patient identity confirmed:  Verbally with patient, arm band and hospital-assigned identification number  NA - No sedation, light sedation, or local anesthesia  Confirmation Checklist:  Patient's identity using two indicators, relevant allergies, procedure was appropriate and matched the consent or emergent situation and correct equipment/implants were available  Time out: Immediately prior to the procedure a time out was called    Universal Protocol: the Joint Commission Universal Protocol was followed    Preparation: Patient was prepped and draped in usual sterile fashion    ESBL (mL):  2     ANESTHESIA    Local Anesthetic:  Lidocaine 2% without epinephrine  Anesthetic Total (mL):  3      SEDATION    Patient Sedated: No        Preparation: skin prepped with 2% chlorhexidine  Skin prep agent: skin prep agent completely dried prior to procedure  Sterile barriers: maximum sterile barriers were used: cap, mask, sterile gown, sterile gloves, and large sterile sheet  Hand hygiene: hand hygiene performed prior to central venous catheter insertion  Type of line used: Midline  Catheter type: single lumen  Lumen type: valved  Catheter size: 4 Fr  Brand: Bard  Lot number: [id number]  Placement method: MST and ultrasound  Number of attempts: 3  Difficulty threading catheter: no  Successful placement: yes  Orientation: left    Location: basilic vein  Tip Location: distal to axillary vein  Arm circumference: adults 10 cm  Extremity circumference: 30  Visible catheter length: 0  Internal length: 20 cm  Dressing and securement: adhesive  securement device, chlorhexidine disc applied, dressing applied, line secured, occlusive dressing applied, statlock and sterile dressing applied  Post procedure assessment: blood return through all ports  PROCEDURE   Patient Tolerance:  Patient tolerated the procedure well with no immediate complicationsDescribe Procedure: Attempted x2 to place in cephalic without success however, was able to place in basilac. Good blood return and flushed without difficulty. Nurse informed to use.

## 2023-08-24 NOTE — PROGRESS NOTES
Patient has been somnolent this p.m.  VSS; on RA - on and off, as she is removing her nasal cannula.  Mid line is in place; all electrolytes have been replaced. One more dose of phosphorus is due later in the evening.  Dressing changes has been completed as ordered.   She is able to swallow pills, one by one.   Her labia are red; purewick was removed.  She has refused turning several times.     Patient came without her earrings. ICU nurse is aware.

## 2023-08-24 NOTE — PROGRESS NOTES
Neuro: Disoriented to situation. Somnolent to restless. Otherwise intact.     CV: BP soft in am and continued decrease in afternoon, norepi restarted at 1600. -120.    Resp: Productive cough. 2 L oxymask while asleep. Coarse breath sounds.     GI: No BM. Reg diet.     : external cath working well. Good UOP.     Skin: R hip chronic wound, WOCN ordered daily dressing changes. Redness under skin folds on abdomen, miconizole started.

## 2023-08-25 NOTE — PROGRESS NOTES
"[facility]    Internal Medicine Hospitalist Progress Note  08/25/2023  I evaluated patient on the above date.    Jose Raul Noriega Jr., MD  [phone] (p)  Text Page  Vocera        Assessment & Plan New actions/orders today (08/25/2023) are underlined. All lab results in the assessment and plan were reviewed.    Melanie Cox is a 62 year old female with a past medical history significant for COPD; HTN; CHF (HFpEF); chronic spine/disc disease with chronic pain syndrome on opioids; polysubstance abuse; chronic pressure ulcer; and GERD; who presented to OSH 8/22/2023 with fevers with AMS and found to be in septic shock with signs of LLL pneumonia. Transferred to [facility] ICU 8/22/2023 for management.    On initially evaluation (per ICU H&P): \"Was noted to be hypotensive with SBP in 70's and HR in 140's. She was given 1L IVF and SBP improved to 140's and HR to 100's. It again started dropping with SBP in 80's and was given addl 1L IVF. She had a temp of 103.3F.  Her lactic acid improved from 7 to 3. Her BMP shows low Bicarb and VBG is well compensated. Her CT head/CT spine was neg. CXR was normal per report. CT abd interesting showed left LL pna. She was started on Zosyn.\"    Transferred to [facility] ICU 8/22/2023. Pt was treated with antibiotics and pressors and subsequently improved. Pressors were weaned off and pt was transferred to the IM service 8/23/2023.      LLL pneumonia with septic shock and acute hypoxic respiratory failure.  COPD with ongoing tobacco use.  Possible UTI.  * Initial presentation to OSH as above. Also had UA with 11-25 WBC, small blood, small LE though mod epithelial cells. Blood,   * Transferred to [facility] ICU 8/22. Started on vanco, pipericillin-tazobactam and doxycycline on admit. MRSA nasal PCR negative. BC's  NGTD.   * On 8/23, weaned off pressors and transferred to IM team. Vanco stopped. Later in the afternoon, became hypotensive again requiring more fluids and " pressors, weaned off in the evening.  * On 8/24, BP's improved, still tachycardic. Sats stable on 1-2L O2.  * On 8/25, febrile to 100.5 in am. BP's stable (elevated). WBC improved.   Recent Labs   Lab 08/25/23  0616 08/25/23  0506 08/24/23  0926 08/23/23  1641 08/23/23  0114   WBC  --  11.3* 13.7*  --  24.6*   LACT 1.0  --   --  1.4 1.5   PCAL  --   --   --   --  2.65*     - Continue O2, wean as able.  - Discontinue IVF's.  - Continue pipericillin-tazobactam (started 8/23)  - Continue doxycycline (started 8/23) - stop after 8/27.  - Continue umeclidinium and PRN ipratropium-albuterol nebs   - Continue PRN acetaminophen 650 mg q8h; hold if LFT's worsen.  - Continue to monitor cultures.    Acute septic, metabolic and toxic encephalopathy.  * Initial presentation to OSH as above. Presented with confusion and decreased LOC. EtOH level in ED .048. CT head negative for acute findings.  * On 8/23, mental status improved. She was conversant, A&O. Drowsy following opioids. Neuro intact.  * On 8/25, mental status continues to improve.  - Re-orient as needed.  - Maintain normal day/night, sleep/wake cycles.  - Minimize sedating medications as able.    Elevated LFTs, unclear etiology, question hypoperfusion from sepsis, alcohol use.  * Pt is s/p cholecystectomy.  * LFT's 8/23 showed tbili 1.4, , AST 37, , albumin 2.8, INR 1.45. N  Recent Labs   Lab 08/25/23  0506 08/24/23  0926 08/23/23  0114   ALBUMIN 2.6* 2.6* 2.8*   BILITOTAL 0.7 0.5 1.4*   ALT 74* 95* 150*   AST 25 21 37   ALKPHOS 143* 135* 162*   LIPASE  --   --  8*   AMYLASE  --   --  22*   INR  --   --  1.45*   CR 0.45* 0.53 0.59     - Continue to treat other issues as noted.  - Continue to monitor LFT's - repeat in am.    Hypertension (benign essential).  Chronic CHF (HFpEF).  [PTA: metoprolol 50 mg BID.]  * ECHO 3/2022 showed LV EF 73% with normal RV function.   * BNP 8/22 in ED elevated at 10,700, not clinically volume up. Issues with septic shock as  noted. Metoprolol held on admit.  * BP's more elevated 8/25.  - Restart metoprolol 50 mg BID.  - Continue to monitor i/o's, daily wts.    Anemia, suspect dilutional and/or chronic.  * Hgb 10.2 on admit 8/23. No overt clinical signs of major bleeding.  Recent Labs   Lab 08/25/23  0506 08/24/23  0926 08/23/23  0114   HGB 8.1* 8.4* 10.2*     - Continue to monitor CBC - repeat in am.  - Consider prbc transfusion if hgb </= 7.0 or if significant bleeding with hemodynamic instability or if symptomatic.    Coagulopathy, suspect due to decreased PO intake.  * INR 1.45 on admit 8/23.  Recent Labs   Lab 08/23/23  0114   INR 1.45*     - Continue diet as tolerated.  - Repeat INR in am.    Alcohol use.  * On 8/23, pt reported periodic drinking at home, denied daily use or hx withdrawal. Etoh level on admission 0.048 as above. No overt withdrawal noted.  - Continue to monitor clinically.    Chronic R hip wound.  * Follows with general surgery after prior surgical intervention and treatment with wound vac. Last seen 7/31/2023.  * On admit, wound did not appears acutely infected.  * WOC consulted 8/23.  - Continue local wound cares per WOC RN.    Chronic pain syndrome.  DDD.  * Per chart review, there was some concern for misuse. Recently switched from oxycodone to morphine IR at appt 8/9. PTA: morphine 15mg q4 hours four times daily, however had been taking more like q4 and also taking prior oxycodone until it runs out. PTA also on lidocaine patch. Gabapentin on med list but pt not taking.  - Continue lidocaine 4% patch q24h; PRN morphine 15 mg q4h; PRN IV hydromorphone; minimize opioids as able.    GERD  - Continue pantoprazole and sucralfate.    Vulnerable adult.  * Per report, arrived to ED covered in stool. Chart review indicated prior admission after laying on the floor for several days, concern for unsafe living situation and hx multiple vulnerable adult reports filed.   - SW following.    Weakness and physical  "deconditioning due to multiple acute and chronic medical issues.  - Order PT and OT.    Hypokalemia, hypophosphatemia.  * Potassium and phosphorus low at times this hospitalization. Treated with replacement.  Recent Labs   Lab 08/25/23  0506 08/25/23  0103 08/24/23  1901 08/24/23  0926 08/23/23  1125 08/23/23  0114   POTASSIUM 4.0  --   --  3.7 3.8 2.5*   MAG  --   --  2.2 1.5*  --  2.0   PHOS  --  2.6  --  1.8* 2.8 2.3*     - Continue PRN K and phos replacement.    Hypocalcemia.  * Given IV calcium 8/23.  Recent Labs   Lab 08/25/23  0506 08/24/23  0926 08/23/23  0114   IAN 7.8* 8.1* 7.4*   ICAW  --   --  4.0*     - Continue to monitor calcium.    Lines, etc.  * Midline placed 8/24.  - Continue midline.      Clinically Significant Risk Factors            # Hypomagnesemia: Lowest Mg = 1.5 mg/dL in last 2 days, will replace as needed   # Hypoalbuminemia: Lowest albumin = 2.6 g/dL at 8/25/2023  5:06 AM, will monitor as appropriate    # Coagulation Defect: INR = 1.45 (Ref range: 0.85 - 1.15) and/or PTT = N/A, will monitor for bleeding           # Overweight: Estimated body mass index is 27.22 kg/m  as calculated from the following:    Height as of this encounter: 1.575 m (5' 2\").    Weight as of this encounter: 67.5 kg (148 lb 13 oz).  , PRESENT ON ADMISSION              COVID-19 testing.  COVID-19 PCR Results           No data to display              COVID-19 Antibody Results, Testing for Immunity           No data to display                Diet: Regular Diet Adult      Prophylaxis: PCD's, ambulation. Enoxaparin.  Leal Catheter:   Not present  Lines:   PRESENT             Code Status: Full Code    Disposition Plan   Expected discharge: 1-2d recommended to  [facility]  pending  continued improvement .  Entered: Jose Raul Noriega MD 08/25/2023, 10:58 AM         Interval History   [place] [facility] [place]  Febrile to 100.5.  Notes continued cough.  Decreased appetite but tolerating diet.    -Data " "reviewed today: I reviewed all new labs and imaging over the last 24 hours. I personally reviewed no images or EKG's today.    Physical Exam    ,   Blood pressure (!) 163/110, pulse 112, temperature 99.1  F (37.3  C), temperature source Oral, resp. rate 20, height 1.575 m (5' 2\"), weight 67.5 kg (148 lb 13 oz), SpO2 95 %. O2 Device: Nasal cannula Oxygen Delivery: 2 LPM  Vitals:    08/23/23 0030 08/23/23 0400 08/24/23 0400   Weight: 62.7 kg (138 lb 3.7 oz) 63 kg (138 lb 14.2 oz) 67.5 kg (148 lb 13 oz)     Vital Signs with Ranges  Temp:  [98.6  F (37  C)-100.5  F (38.1  C)] 99.1  F (37.3  C)  Pulse:  [] 112  Resp:  [14-25] 20  BP: (102-163)/() 163/110  SpO2:  [90 %-97 %] 95 %  Patient Vitals for the past 24 hrs:   BP Temp Temp src Pulse Resp SpO2   08/25/23 0823 (!) 163/110 99.1  F (37.3  C) Oral 112 20 95 %   08/25/23 0539 (!) 153/92 (!) 100.5  F (38.1  C) Oral (!) 122 25 93 %   08/25/23 0324 -- 99.9  F (37.7  C) -- -- -- --   08/24/23 2000 116/64 -- -- 97 22 97 %   08/24/23 1535 112/66 -- -- 97 16 96 %   08/24/23 1415 102/60 -- -- 105 16 92 %   08/24/23 1129 126/71 98.6  F (37  C) -- 112 14 90 %       I/O's Last 24 hours  I/O last 3 completed shifts:  In: 290 [P.O.:240; I.V.:50]  Out: 200 [Urine:200]    Constitutional: Awake, alert, more calm.  Respiratory: Diminished in bases. No crackles or wheezes.  Cardiovascular: RRR, no m/r/g.  GI: Soft, nt, nd, +BS.  Skin/Integumen: No bilateral lower extremity edema.  Other:        Data    Labs reviewed.  Recent Labs   Lab 08/25/23  0506 08/24/23  0926 08/23/23  1125 08/23/23  0414 08/23/23  0114   WBC 11.3* 13.7*  --   --  24.6*   HGB 8.1* 8.4*  --   --  10.2*   MCV 88 91  --   --  87    174  --   --  195   INR  --   --   --   --  1.45*   * 136  --   --  134*   POTASSIUM 4.0 3.7 3.8  --  2.5*   CHLORIDE 102 105  --   --  102   CO2 23 21*  --   --  17*   BUN 7.6* 12.1  --   --  12.7   CR 0.45* 0.53  --   --  0.59   ANIONGAP 10 10  --   --  15   IAN " 7.8* 8.1*  --   --  7.4*   GLC 98 77  --  112* 123*   ALBUMIN 2.6* 2.6*  --   --  2.8*   PROTTOTAL 5.4* 5.6*  --   --  5.9*   BILITOTAL 0.7 0.5  --   --  1.4*   ALKPHOS 143* 135*  --   --  162*   ALT 74* 95*  --   --  150*   AST 25 21  --   --  37   LIPASE  --   --   --   --  8*       No lab results found.  Recent Labs   Lab 08/25/23  0506 08/24/23  0926 08/23/23  0414 08/23/23  0114 08/23/23  0028   GLC 98 77 112* 123* 93        No lab results found.     Recent Labs   Lab 08/23/23  0114   INR 1.45*       Recent Labs   Lab 08/25/23  0616 08/25/23  0506 08/24/23  0926 08/23/23  1641 08/23/23  0114   WBC  --  11.3* 13.7*  --  24.6*   LACT 1.0  --   --  1.4 1.5   PCAL  --   --   --   --  2.65*         MICRO:  CULTURES (INCLUDING BLOOD AND URINE):  Recent Labs   Lab 08/24/23  0001 08/23/23  2048 08/23/23  0240   CULTURE Culture in progress >10 Squamous epithelial cells/low power field indicates oral contamination. Please recollect. No growth after 2 days  No growth after 2 days         No results found for this or any previous visit (from the past 24 hour(s)).    Medications   All medications were reviewed.    Infusions:   lactated ringers 100 mL/hr at 08/25/23 0710     Scheduled Medications:   doxycycline  100 mg Intravenous Q12H    enoxaparin ANTICOAGULANT  40 mg Subcutaneous Q24H    folic acid  1 mg Oral Daily    Lidocaine  1 patch Transdermal Q24H    multivitamin, therapeutic  1 tablet Oral Daily    nystatin   Topical BID    pantoprazole  40 mg Oral QAM AC    piperacillin-tazobactam  3.375 g Intravenous Q6H    potassium & sodium phosphates  1 packet Oral or Feeding Tube Q4H    sodium chloride (PF)  10 mL Intracatheter Q8H    sodium chloride (PF)  10 mL Intracatheter Q8H    sodium chloride (PF)  3 mL Intracatheter Q8H    sodium chloride (PF)  3 mL Intracatheter Q8H    sucralfate  1 g Oral 4x Daily    vitamin B1  100 mg Oral Daily    umeclidinium  1 puff Inhalation Daily     PRN Medications:  acetaminophen **OR**  acetaminophen, albuterol, glucose **OR** dextrose **OR** glucagon, HYDROmorphone, ipratropium - albuterol 0.5 mg/2.5 mg/3 mL, lidocaine 4%, lidocaine (buffered or not buffered), morphine, naloxone **OR** naloxone **OR** naloxone **OR** naloxone, nitroGLYcerin, sodium chloride (PF), sodium chloride (PF), sodium chloride (PF), sodium chloride (PF)

## 2023-08-25 NOTE — PROGRESS NOTES
Patient has had several diarrheal episodes today; Imodium was administered X 1.  She refused PT/OT, but ambulated with RN to the bathroom with FWW/GB.  For pain management she is requesting Dilaudid only.  She is currently on 2 LPM. Tachycardic; continent.  Dressing change was completed as ordered.

## 2023-08-25 NOTE — PROGRESS NOTES
Orientations: A&Ox4, forgetful    Vitals/Pain: VSS on 2L NC/ complain of Chronic pain, PRN didlaudid given. SEE MAR. Had a temp of 100.5 @ 0600am, hospitalist paged and tylenol administered per order.    Tele: ST, HR 100s    Lines/Drains: Midline SL, LR @100ml/hr, Int abx    Skin/Wounds: Scattered bruising, chronic R. hip injury ulcer    GI/: incontinent of bowel and bladder. BM x1    Labs: Abnormal/Trends, Electrolyte Replacement- K+ and phos replaced yesterday    Ambulation/Assist: NOT OOB. Ax2 T&R    Sleep Quality: poor    Plan: continue plan of care.

## 2023-08-25 NOTE — CONSULTS
Care Management Initial Consult    General Information  Assessment completed with: Patient, Spouse or significant other,    Type of CM/SW Visit: Initial Assessment    Primary Care Provider verified and updated as needed:     Readmission within the last 30 days:        Reason for Consult: discharge planning  Advance Care Planning:            Communication Assessment  Patient's communication style: spoken language (English or Bilingual)             Cognitive  Cognitive/Neuro/Behavioral: WDL  Level of Consciousness: alert  Arousal Level: opens eyes spontaneously  Orientation: oriented x 4  Mood/Behavior: calm, cooperative  Best Language: 0 - No aphasia  Speech: garbled, spontaneous    Living Environment:   People in home: significant other     Current living Arrangements: mobile home      Able to return to prior arrangements:         Family/Social Support:  Care provided by: self, spouse/significant other  Provides care for: no one, unable/limited ability to care for self  Marital Status: Lives with Significant Other  Significant Other          Description of Support System: Involved, Supportive    Support Assessment: Adequate family and caregiver support    Current Resources:   Patient receiving home care services: Yes  Skilled Home Care Services: Skilled Nursing  Community Resources: Home Care  Equipment currently used at home: none  Supplies currently used at home:      Employment/Financial:  Employment Status: disabled        Financial Concerns: No concerns identified   Referral to Financial Worker: No       Does the patient's insurance plan have a 3 day qualifying hospital stay waiver?  No    Lifestyle & Psychosocial Needs:  Social Determinants of Health     Tobacco Use: High Risk (8/22/2023)    Patient History     Smoking Tobacco Use: Every Day     Smokeless Tobacco Use: Unknown     Passive Exposure: Not on file   Alcohol Use: Not on file   Financial Resource Strain: Not on file   Food Insecurity: Not on file  "  Transportation Needs: Not on file   Physical Activity: Not on file   Stress: Not on file   Social Connections: Not on file   Intimate Partner Violence: Not on file   Depression: Not on file   Housing Stability: Not on file       Functional Status:  Prior to admission patient needed assistance:              Mental Health Status:  Mental Health Status: No Current Concerns       Chemical Dependency Status:  Chemical Dependency Status: No Current Concerns             Values/Beliefs:  Spiritual, Cultural Beliefs, Advent Practices, Values that affect care: no               Additional Information:  SW consult for discharge planning. SW reviewed chart. Phone call from pt's CADI , Kj, [phone]. She works for [employer]. Kj is newly assigned and no [facility] services are in place for pt yet. She does have a RN from [facility] three times a week for wound care. Updated jK. SW attempted to meet with pt. Explained SW role in the hospital. Pt responded she did not want to answer any question and asked SW to leave the room. SW called pt's emergency contact, Clarence. He reports he is pt's fiance. He states they live together in a mobile home in [place]. He is retired, helps pt walk around the house as needed, does the cooking and shopping. She is not alone. He reports she is independent with showering, toileting and medications. She has a son in [place] and a daughter in [place], but neither are involved. No HCD. He reports they have talked about filling one out and he usually helps her with decision making. Dr Davidson is her primary care provider. Discussed involving PT/OT to make discharge recommendations. Briefly dicussed TCU. She has never been and he doubts she would be agreeable. However, he states \"if the doctor say she has to go, she has to go.\" Clarence appears involved and supportive. SW will continue to follow for discharge planning. Pt would benefit from PT/OT involvement to assist with " discharge planning.     HOWIE Rivas, [facility]  [phone] Desk phone  [phone] Cell/text (Preferred)  [facility]

## 2023-08-25 NOTE — PROVIDER NOTIFICATION
Brief update:    Paged regarding fever, some tachycardia with this as well.  Blood pressure sustained.    Added acetaminophen.  Note mildly elevated LFTs, but minimally so.  If LFTs continue to uptrend, can discontinue this order.    Murray Lopes MD  5:48 AM

## 2023-08-26 NOTE — PROGRESS NOTES
[facility]    Medicine Progress Note - Hospitalist Service    Date of Admission:  8/23/2023    Assessment & Plan     Melanie Cox is a 62 year old female with a past medical history significant for COPD; HTN; CHF (HFpEF); chronic spine/disc disease with chronic pain syndrome on opioids; polysubstance abuse; chronic pressure ulcer; and GERD; who presented to OSH 8/22/2023 with fevers with AMS and found to be in septic shock with signs of LLL pneumonia.   Transferred to [facility] ICU 8/22/2023 for management.  Transferred to [facility] ICU 8/22/2023. Pt was treated with antibiotics and pressors and subsequently improved.   Pressors were weaned off and pt was transferred to the IM service 8/23/2023.      LLL pneumonia with septic shock and acute hypoxic respiratory failure.  COPD with ongoing tobacco use.  Possible UTI.  Improving slowly  * Initial presentation to OSH as above. Also had UA with 11-25 WBC, small blood, small LE though mod epithelial cells. Blood,   * Transferred to [facility] ICU 8/22. Started on vanco, pipericillin-tazobactam and doxycycline on admit. MRSA nasal PCR negative. BC's  NGTD.   * On 8/23, weaned off pressors and transferred to IM team. Vanco stopped. Later in the afternoon, became hypotensive again requiring more fluids and pressors, weaned off in the evening.  * On 8/24, BP's improved, still tachycardic. Sats stable on 1-2L O2.  * On 8/25, febrile to 100.5 in am. BP's stable (elevated). WBC improved.   - Continue pipericillin-tazobactam (started 8/23)  - Continue doxycycline (started 8/23) - stop after 8/27.  - Continue umeclidinium and PRN ipratropium-albuterol nebs   - Continue PRN acetaminophen 650 mg q8h; hold if LFT's worsen.  - Continue to monitor cultures.    Acute septic, metabolic and toxic encephalopathy.  Improving   Presented with confusion and decreased LOC. EtOH level in ED .048. CT head negative for acute findings.  On 8/23, mental status improved.    She was conversant, A&O. Drowsy following opioids. Neuro intact.  * On 8/25, mental status continues to improve.  - Re-orient as needed.  - Maintain normal day/night, sleep/wake cycles.  - Minimize sedating medications as able.    Elevated LFTs, unclear etiology, question hypoperfusion from sepsis, alcohol use.  * Pt is s/p cholecystectomy.  - Continue to treat other issues as noted.  - Continue to monitor LFT's   Numbers are improving      Hypertension (benign essential).  Chronic CHF (HFpEF).  [PTA: metoprolol 50 mg BID.]  * ECHO 3/2022 showed LV EF 73% with normal RV function.   * BNP 8/22 in ED elevated at 10,700, not clinically volume up. Issues with septic shock as noted. Metoprolol held on admit.  * BP's more elevated 8/25.  On metoprolol 50 mg twice daily  - Continue to monitor i/o's, daily wts.  Hydralazine for high blood pressure    Anemia, suspect dilutional and/or chronic.  * Hgb 10.2 on admit 8/23. No overt clinical signs of major bleeding.  - Continue to monitor CBC -     Coagulopathy, suspect due to decreased PO intake.  * INR 1.45 on admit 8/23.      Alcohol use.  On 8/23, pt reported periodic drinking at home, denied daily use or hx withdrawal. Etoh level on admission 0.048 as above. No overt withdrawal noted.  - Continue to monitor clinically.    Chronic R hip wound.  * Follows with general surgery after prior surgical intervention and treatment with wound vac. Last seen 7/31/2023.  * On admit, wound did not appears acutely infected.  * WOC consulted 8/23.  - Continue local wound cares per WOC RN.    Chronic pain syndrome.  DDD.  * Per chart review, there was some concern for misuse.   Recently switched from oxycodone to morphine IR at appt 8/9. PTA: morphine 15mg q4 hours four times daily, however had been taking more like q4 and also taking prior oxycodone until it runs out. PTA also on lidocaine patch. Gabapentin on med list but pt not taking.  - Continue lidocaine 4% patch q24h; PRN morphine  "15 mg q4h; PRN IV hydromorphone; minimize opioids as able.    GERD  - Continue pantoprazole and sucralfate.    Vulnerable adult.  * Per report, arrived to ED covered in stool. Chart review indicated prior admission after laying on the floor for several days, concern for unsafe living situation and hx multiple vulnerable adult reports filed.   - SW following.    Weakness and physical deconditioning due to multiple acute and chronic medical issues.  -PT and OT    Hypokalemia, hypophosphatemia.  * Potassium and phosphorus low at times this hospitalization. Treated with replacement.  - Continue PRN K and phos replacement.    Hypocalcemia.  * Given IV calcium 8/23.    - Continue to monitor calcium.    Lines, etc.  * Midline placed 8/24.  - Continue midline.    I discontinue telemetry as patient is clinically improving with IV antibiotics  She is alert and awake but at times she gets confused but better than before  Discontinue IMC status       Discussed care with patient and her RN       Diet: Regular Diet Adult    DVT Prophylaxis: Enoxaparin (Lovenox) SQ  Leal Catheter: Not present  Lines: PRESENT             Cardiac Monitoring: ACTIVE order. Indication: See H&P and/or Progress Notes  Code Status: Full Code      Clinically Significant Risk Factors            # Hypomagnesemia: Lowest Mg = 1.4 mg/dL in last 2 days, will replace as needed   # Hypoalbuminemia: Lowest albumin = 2.6 g/dL at 8/26/2023 10:10 AM, will monitor as appropriate  # Coagulation Defect: INR = 1.18 (Ref range: 0.85 - 1.15) and/or PTT = N/A, will monitor for bleeding           # Overweight: Estimated body mass index is 27.22 kg/m  as calculated from the following:    Height as of this encounter: 1.575 m (5' 2\").    Weight as of this encounter: 67.5 kg (148 lb 13 oz)., PRESENT ON ADMISSION            Disposition Plan      Expected Discharge Date: 08/28/2023,  3:00 PM                Darline Hall MD  Hospitalist Service  [facility]  Securely message with [employer] (more info)  Text page via [employer] Paging/Directory   ______________________________________________________________________    Interval History   d  Patient has chronic pain so she keeps complaining about diet and asking for pain medication  Her appetite is not good  Per nursing staff intake is also not good  She has frequently for pain medication  Physical Exam   Vital Signs: Temp: 99.7  F (37.6  C) Temp src: Axillary BP: (!) 156/111 Pulse: 110   Resp: 28 SpO2: 90 % O2 Device: Oxymask Oxygen Delivery: 3 LPM  Weight: 148 lbs 12.97 oz  She is lying in bed comfortably  Not in any kind of distress she is alert awake oriented    Answering questions and follows command    Medical Decision Making             Data     I have personally reviewed the following data over the past 24 hrs:    12.7 (H)  \   9.2 (L)   / 211     136 100 3.2 (L) /  99   3.5 27 0.41 (L) \     ALT: 60 (H) AST: 24 AP: 136 (H) TBILI: 0.7   ALB: 2.6 (L) TOT PROTEIN: 5.8 (L) LIPASE: N/A     Procal: N/A CRP: N/A Lactic Acid: 0.6 (L)

## 2023-08-26 NOTE — PLAN OF CARE
Pt A+Ox4 but forgetful. Lethargic at times. VSS ex tachycardic at times. On 3L O2. Tele SR/ST. Lung sounds diminished but coarse in RLL. Bowels active, flatus+, loose BMs- imodium given. Voiding adequately. R hip dressing CDI. PO morphine given for pain, pt demands both the instant release morphine and IV dilaudid. Up w/ A1 gait belt and walker. Tolerating regular diet.

## 2023-08-26 NOTE — PLAN OF CARE
Goal Outcome Evaluation:         Orientations: A&Ox3, forgetful     Vitals/Pain: VSS on 3L NC( freq congested cough NP). Frequent complains of chronic pain, PRN didlaudid given. Had a temp of 100.7 in the afternoon, hospitalist notified     Tele: SR-ST, 110s     Lines/Drains: Midline SL inter abx and electrolytes replacements K/Mg     Skin/Wounds: LUE bruising, chronic R. hip injury ulcer; dressing changed. Abd folds redness     GI/: incontinent of bowel and bladder. BM x1     Labs: Abnormal/Trends, Electrolyte Replacement- K/Mg. Lactic BPA 0.6 in the merari     Ambulation/Assist: A1 walker to BSC     PO: poor appetite, prefers cold ice cream, has dry/sensitive mouth         Plan: IMC status d/c'ed

## 2023-08-27 NOTE — PROGRESS NOTES
Patient is A/O x 4, vss, a-febrile, c/o back pain, IV dilaudid helping, patient has chronic back pain, LEMUS, on oxygen at 3-4 L NC, PT following, recommend TCU when clinically stable, incontinent of bladder pure wick in place, up to bedside commode with A1 GB and walker, patient has a chronic pressure wound on left hip, dressing changed CDI, reposition in bed.

## 2023-08-27 NOTE — PROGRESS NOTES
"   08/27/23 0900   Appointment Info   Signing Clinician's Name / Credentials (PT) Latrice Hubbard, PT   Living Environment   People in Home significant other   Current Living Arrangements mobile home   Home Accessibility no concerns   Transportation Anticipated family or friend will provide   Self-Care   Usual Activity Tolerance moderate   Current Activity Tolerance moderate   Regular Exercise No   Equipment Currently Used at Home walker, rolling;cane, straight   Fall history within last six months no   Activity/Exercise/Self-Care Comment Pt does not elaborate on home set up or prior level of function, becomes somewhat irritated with questions regarding home environment and PLOF.   General Information   Onset of Illness/Injury or Date of Surgery 08/23/23   Referring Physician Dr. Graham   Patient/Family Therapy Goals Statement (PT) To go home   Pertinent History of Current Problem (include personal factors and/or comorbidities that impact the POC) Pt is a 62 year old female admitted with pneumonia.   Existing Precautions/Restrictions fall   Cognition   Affect/Mental Status (Cognition) WFL   Pain Assessment   Patient Currently in Pain Yes, see Vital Sign flowsheet  (10/10 pain \"everywhere\")   Range of Motion (ROM)   ROM Comment ROM appears WFL   Strength (Manual Muscle Testing)   Strength Comments Refuses MMT, appears to have generalized weakness   Bed Mobility   Comment, (Bed Mobility) Min A   Transfers   Comment, (Transfers) CGA   Gait/Stairs (Locomotion)   Comment, (Gait/Stairs) Refuses ambulation   Balance   Balance Comments Good in sitting, fair in standing   Clinical Impression   Criteria for Skilled Therapeutic Intervention Yes, treatment indicated   PT Diagnosis (PT) Impaired ambulation   Influenced by the following impairments Decreased strength, decreased endurance, decreased balance   Functional limitations due to impairments Difficulty with bed mobility, transfers, ambulation   Clinical Presentation (PT " Evaluation Complexity) Stable/Uncomplicated   Clinical Presentation Rationale VSS   Clinical Decision Making (Complexity) low complexity   Planned Therapy Interventions (PT) balance training;bed mobility training;patient/family education;gait training;strengthening;transfer training   Risk & Benefits of therapy have been explained evaluation/treatment results reviewed;care plan/treatment goals reviewed;risks/benefits reviewed;current/potential barriers reviewed;participants voiced agreement with care plan;participants included;patient   PT Total Evaluation Time   PT Eval, Low Complexity Minutes (03387) 10   Physical Therapy Goals   PT Frequency 4x/week   PT Predicted Duration/Target Date for Goal Attainment 09/03/23   PT Goals Bed Mobility;Transfers;Gait   PT: Bed Mobility Independent;Supine to/from sit   PT: Transfers Modified independent;Sit to/from stand;Bed to/from chair;Assistive device   PT: Gait Modified independent;Rolling walker;50 feet   PT Discharge Planning   PT Plan Assess gait, encourage OOB mobility, general strengthening   PT Discharge Recommendation (DC Rec) Transitional Care Facility;home with assist;home with home care physical therapy   PT Rationale for DC Rec At baseline, pt lives in a home with a ramp with her significant other. Pt does not provide details on her PLOF or amount of assistance that is available at discharge. Pt with limited participation in therapy session. Pt does require min A for bed mobility and transfers, declines ambulation. Pt requires maximum encouragement to transfer out of bed and into a chair. Pt would benefit from TCU at discharge if agreeable to participate in therapy and limited assistance available. If patient declines TCU or participation in therapy, pt may be able to discharge home with SO providing 24 hour supervision and assist, along with HHPT.   PT Brief overview of current status Bed mob min A, transfers CGA, declines amb   Total Session Time   Total Session  Time (sum of timed and untimed services) 10

## 2023-08-27 NOTE — PROGRESS NOTES
Care Management Follow Up    Length of Stay (days): 4    Expected Discharge Date: 08/28/2023     Concerns to be Addressed: discharge planning     Patient plan of care discussed at interdisciplinary rounds: Yes    Anticipated Discharge Disposition: Skilled Nursing Facility, Home Care     Anticipated Discharge Services:    Anticipated Discharge DME:      Patient/family educated on Medicare website which has current facility and service quality ratings:    Education Provided on the Discharge Plan: Yes  Patient/Family in Agreement with the Plan:      Referrals Placed by CM/SW:    Private pay costs discussed: Not applicable    Additional Information:  Writer saw PT recommendations for TCU in chart review. Printed off list of choices from Medicare.gov and attempted to meet with patient at bedside. Patient fast asleep.   Writer attempted to call boyfriend to discuss choices but there was no answer. Left message. Sent referrals of facilities near patients home.     Writer spoke with patients significant other. Supports TCU recommendation and thinks patient needs to get stronger prior to returning home. Was in agreement for referrals made and appreciates updates. [facility] preferred location.     MARVEL Barriga

## 2023-08-27 NOTE — PLAN OF CARE
Occupational Therapy: Orders received. Chart reviewed and discussed with care team.? Occupational Therapy not indicated. Pt has declined participation in IP OT for several days. Discussion with pt regarding the importance of continued participation in therapies, pt continuing to decline despite education. Defer functional mobility needs to PT during pt's IP admit. PT recommendation for skilled TCU, defer OT needs to next level of care. Re-order OT as appropriate if pt?has noted consistent participation/progression with IP PT. Defer discharge recommendations to Care Team.? Will complete orders.

## 2023-08-27 NOTE — PROGRESS NOTES
[facility]    Medicine Progress Note - Hospitalist Service    Date of Admission:  8/23/2023    Assessment & Plan   Melanie Cox is a 62 year old female with a past medical history significant for COPD; HTN; CHF (HFpEF); chronic spine/disc disease with chronic pain syndrome on opioids; polysubstance abuse; chronic pressure ulcer; and GERD; who presented to OSH 8/22/2023 with fevers with AMS and found to be in septic shock with signs of LLL pneumonia.  Transferred to [facility] ICU 8/22/2023 for management.  Patient was treated with antibiotics and pressors and subsequently improved.  Patient was transferred to the IM service 8/23/2023.    LLL pneumonia with septic shock and acute hypoxic respiratory failure  COPD with ongoing tobacco use  Possible UTI  Initial presentation to OSH as above. Also had UA with 11-25 WBC, small blood, small LE though mod epithelial cells, blood.  Transferred to [facility] ICU 8/22/23.  Started on vanco, pipericillin-tazobactam and doxycycline on admit. MRSA nasal PCR negative. BC's  NGTD.   On 8/23, weaned off pressors and transferred to IM team. Vanco stopped. Later in the afternoon, became hypotensive again requiring more fluids and pressors, weaned off in the evening.  Gradually improved and transferred out of ICU.  Vancomycin discontinued on 8/23/23.  Continue Zosyn, currently on day #5.  Completes 5 day course of doxycycline today.  Currently on 5 lpm of supplemental oxygen,wean as tolerated.  Continue Incruse Ellipta (formulary substitute for spiriva) and PRN DuoNebs.  Blood cultures negative to date.  Sputum culture with normal adam.    Acute septic, metabolic and toxic encephalopathy, improving  Presented with confusion and decreased LOC. EtOH level in ED 0.048. CT head negative for acute findings.  Mental status improved 8/23. She was conversant, A&O. Drowsy following opioids. Neuro intact. Mental status continues to improve.  Re-orient as  needed.  Maintain normal day/night, sleep/wake cycles.  Minimize sedating medications as able.    Elevated LFTs, unclear etiology, question hypoperfusion from sepsis, alcohol use.  * Patient is s/p cholecystectomy.  Continue to treat other issues as noted.  LFTs improving, recheck in AM.    Hypertension (benign essential)  Chronic CHF (HFpEF)  [PTA: metoprolol 50 mg BID.]  * ECHO 3/2022 showed LV EF 73% with normal RV function.   * BNP 8/22/23 in ED elevated at 10,700, not clinically volume up. Issues with septic shock as noted. Metoprolol held on admit.  BP's more elevated 8/25, PTA metoprolol restarted.  Blood pressure better controlled, continue to monitor.  PRN IV Hydralazine available for high blood pressure.  Monitor I&O and daily weights.    Anemia, suspect dilutional and/or chronic  * Baseline hemoglobin around 14-15 earlier this year. Hgb 10.2 on admit 8/23. No overt clinical signs of major bleeding.  Hemoglobin 9.2 on 8/26/23, recheck in AM.    Coagulopathy, suspect due to decreased PO intake  * INR 1.45 on admit 8/23.  INR improved to 1.18 on 8/25/23.    Alcohol use  On 8/23, patient reported periodic drinking at home, denied daily use or history of withdrawal. Etoh level on admission 0.048 as above. No overt withdrawal noted.  Continue to monitor clinically.    Chronic right hip wound  * Follows with general surgery after prior surgical intervention and treatment with wound vac. Last seen 7/31/2023.  * On admit, wound did not appears acutely infected.  WOC consulted 8/23.  Continue local wound cares per WOC RN.    Chronic pain syndrome  Per chart review, there was some concern for misuse. Recently switched from oxycodone to morphine IR at appt 8/9. PTA: morphine 15mg q4 hours PRN, however also was taking prior oxycodone until it runs out. PTA also on lidocaine patch. Gabapentin on med list but patient not taking.  Continue lidocaine 4% patch q24h; PRN morphine 15 mg q4h; PRN IV hydromorphone; minimize  "opioids as able.    GERD  Continue PTA PPI and sucralfate.    Vulnerable adult  * Per report, arrived to ED covered in stool. Chart review indicated prior admission after laying on the floor for several days, concern for unsafe living situation and history of multiple vulnerable adult reports filed.   SW following.  TCU recommended, but patient currently hesitant.    Weakness and physical deconditioning due to multiple acute and chronic medical issues  PT and OT consulted, recommending TCU.    Hypokalemia  Hypomagnesemia  Hypophosphatemia  Replace and recheck per RN managed protocol.    Hypocalcemia  * Given IV calcium 8/23.  Improved with replacement.  Recheck in AM.    Lines, etc.  * Midline placed 8/24.  Continue midline.       Diet: Regular Diet Adult    DVT Prophylaxis: Enoxaparin (Lovenox) SQ  Leal Catheter: Not present  Lines: PRESENT             Cardiac Monitoring: None  Code Status: Full Code      Clinically Significant Risk Factors        # Hypokalemia: Lowest K = 3.2 mmol/L in last 2 days, will replace as needed     # Hypomagnesemia: Lowest Mg = 1.4 mg/dL in last 2 days, will replace as needed   # Hypoalbuminemia: Lowest albumin = 2.6 g/dL at 8/26/2023 10:10 AM, will monitor as appropriate  # Coagulation Defect: INR = 1.18 (Ref range: 0.85 - 1.15) and/or PTT = N/A, will monitor for bleeding           # Overweight: Estimated body mass index is 26.09 kg/m  as calculated from the following:    Height as of this encounter: 1.575 m (5' 2\").    Weight as of this encounter: 64.7 kg (142 lb 10.2 oz).             Disposition Plan      Expected Discharge Date: 08/28/2023,  3:00 PM                Zbigniew Arreaga MD  Hospitalist Service  [facility]  Securely message with [employer] (more info)  Text page via [employer] Paging/Directory   ______________________________________________________________________    Interval History   Melanie L Kenny was seen today. She feels a little better today. " Shortness of breath improving. Still has a productive cough. Denies fevers, chest pain, nausea, abdominal pain. Has chronic pain in her shoulders, back. Updated her significant other by phone today.    Physical Exam   Vital Signs: Temp: 99.1  F (37.3  C) Temp src: Oral BP: 124/72 Pulse: 59   Resp: 18 SpO2: 97 % O2 Device: Nasal cannula Oxygen Delivery: 5 LPM  Weight: 142 lbs 10.2 oz    Constitutional: awake, alert, cooperative, no apparent distress, laying in the hospital bed  Respiratory: no increased work of breathing, diminished at the bases  Cardiovascular: regular rate and rhythm, normal S1 and S2, no murmur noted  GI: normal bowel sounds, soft, non-distended, non-tender  Skin: warm, dry  Musculoskeletal: no lower extremity pitting edema present  Neurologic: awake, alert, answers questions appropriately, moves all extremities    Medical Decision Making       60 MINUTES SPENT BY ME on the date of service doing chart review, history, exam, documentation & further activities per the note.      Data     I have personally reviewed the following data over the past 24 hrs:    N/A  \   N/A   / N/A     N/A N/A N/A /  N/A   3.5 N/A N/A \     Procal: N/A CRP: N/A Lactic Acid: 0.8

## 2023-08-27 NOTE — PLAN OF CARE
Goal Outcome Evaluation:       Pt. A&O x4, forgetful. Vitals stable on 3 lpm O2 via NC. Afebrile. Chronic pain managed with PRN dilaudid. Coarse lung sounds. Tele: SR w/ 1st degree AVB. Incontinent of B & B. External cath/purewick in place. Active BS, no BM, passing flatus. Tolerating regular diet. L single lumen midline, SL and intermittent IV abx. Bruising to LUE, R. hip pressure injury, dressing, CDI. Blanchable redness on coccyx/buttocks and abdominal folds. Ax1 w/GBW. K/Mg protocols.   Plan- continue plan of care.

## 2023-08-28 NOTE — PLAN OF CARE
Neuro: A&O X4  Tele/cardiac: N/A  Respiration: 3-4L NC  Activity:a1 gbw, has been declining PT/OT activities  Pain:dilaudid for back and shoulder pain  Drips: midline SL  Drains/tubes: None  Skin:chronic pressure wound L. hip  GI/: incontinent, tolerating diet  Aggression color: green  Isolation: one

## 2023-08-28 NOTE — PROVIDER NOTIFICATION
"MD Notification    Notified Person: MD    Notified Person Name: Gibson    Notification Date/Time: 8/28/23 1614    Notification Interaction: vockenneth    Purpose of Notification: The pt just received Morphine at 1515 and wants IV Dilaudid now. Jose Angel SANCHEZ who was day shift educated the pt on Morphine and the pt proceeded to be very rude and cuss her out. Upon meeting the pt she asked immediately for the Dilaudid. I asked the pt if she could wait educating the pt on the adverse effects of multiple narcotics too close together. She proceeded to yell saying\"I don't care\", \"I don't need another lecture\" and \"I'm going to tell your higher ups if you don't give me my meds\".     Orders Received: continue to educate the pt    Comments:    "

## 2023-08-28 NOTE — PROGRESS NOTES
[facility]    Medicine Progress Note - Hospitalist Service    Date of Admission:  8/23/2023    Assessment & Plan   Melanie Cox is a 62 year old female with a past medical history significant for COPD; HTN; CHF (HFpEF); chronic spine/disc disease with chronic pain syndrome on opioids; polysubstance abuse; chronic pressure ulcer; and GERD; who presented to OSH 8/22/2023 with fevers with AMS and found to be in septic shock with signs of LLL pneumonia.  Transferred to [facility] ICU 8/22/2023 for management.  Patient was treated with antibiotics and pressors and subsequently improved.  Patient was transferred to the IM service 8/23/2023.    LLL pneumonia with septic shock and acute hypoxic respiratory failure  COPD with ongoing tobacco use  Possible UTI  Initial presentation to OSH as above. Also had UA with 11-25 WBC, small blood, small LE though mod epithelial cells, blood.  Transferred to [facility] ICU 8/22/23.  Started on vanco, pipericillin-tazobactam and doxycycline on admit. MRSA nasal PCR negative. BC's  NGTD.   On 8/23, weaned off pressors and transferred to IM team. Vanco stopped. Later in the afternoon, became hypotensive again requiring more fluids and pressors, weaned off in the evening.  Gradually improved and transferred out of ICU.  Vancomycin discontinued on 8/23/23.  Continue Zosyn, currently on day #6/7.  Completes 5 day course of doxycycline today.  Currently on 2-3 lpm of supplemental oxygen, wean as tolerated.  Continue Incruse Ellipta (formulary substitute for spiriva) and PRN DuoNebs.  Blood cultures negative to date.  Sputum culture with normal adam.    Acute septic, metabolic and toxic encephalopathy, improving  Presented with confusion and decreased LOC. EtOH level in ED 0.048. CT head negative for acute findings.  Mental status improved 8/23. She was conversant, A&O. Drowsy following opioids. Neuro intact. Mental status continues to improve.  Re-orient as  needed.  Maintain normal day/night, sleep/wake cycles.  Minimize sedating medications as able.    Diarrhea  Multiple loose stools this morning. Also complaining of abdominal pain. No fevers. WBC with in normal limits. Has been on Zosyn for the past 6 days.  Will check stool for c.diff.    Elevated LFTs, unclear etiology, question hypoperfusion from sepsis, alcohol use.  * Patient is s/p cholecystectomy.  Continue to treat other issues as noted.  LFTs improved, monitor intermittently.    Hypertension (benign essential)  Chronic CHF (HFpEF)  [PTA: metoprolol 50 mg BID.]  * ECHO 3/2022 showed LV EF 73% with normal RV function.   * BNP 8/22/23 in ED elevated at 10,700, not clinically volume up. Issues with septic shock as noted. Metoprolol held on admit.  BP's more elevated 8/25, PTA metoprolol restarted.  Blood pressure better controlled, continue to monitor.  PRN IV Hydralazine available for high blood pressure.  Monitor I&O and daily weights.    Anemia, suspect dilutional and/or chronic  * Baseline hemoglobin around 14-15 earlier this year. Hgb 10.2 on admit 8/23. No overt clinical signs of major bleeding.  Hemoglobin 9.2 on 8/26/23, recheck in AM.    Coagulopathy, suspect due to decreased PO intake  * INR 1.45 on admit 8/23.  INR improved to 1.18 on 8/25/23.    Alcohol use  On 8/23, patient reported periodic drinking at home, denied daily use or history of withdrawal. Etoh level on admission 0.048 as above. No overt withdrawal noted.  Continue to monitor clinically.    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 Last seen 7/31/2023.  * On admit, wound did not appears acutely infected.  WOC consulted 8/23.  Continue local wound cares per WOC RN.    Chronic pain syndrome  Per chart review, there was some concern for misuse. Recently switched from oxycodone to morphine IR at appt 8/9. PTA: morphine  "15mg q4 hours PRN, however also was taking prior oxycodone until it runs out. PTA also on lidocaine patch. Gabapentin on med list but patient not taking.  Continue lidocaine 4% patch q24h; PRN morphine 15 mg q4h; PRN IV hydromorphone; minimize opioids as able.  Discussed transitioning away from IV to oral pain medications, she seemed agreeable at the time but subsequently demanded IV dilaudid later in the day.  Will consult the pain team, appreciate their assistance.    GERD  Continue PTA PPI and sucralfate.    Vulnerable adult  * Per report, arrived to ED covered in stool. Chart review indicated prior admission after laying on the floor for several days, concern for unsafe living situation and history of multiple vulnerable adult reports filed.   SW following.  TCU recommended, but patient currently reluctant.    Weakness and physical deconditioning due to multiple acute and chronic medical issues  PT and OT consulted, recommending TCU.  Encouraged participation with therapies in the hospital and consideration of TCU at the time of discharge.    Hypokalemia  Hypomagnesemia  Hypophosphatemia  Replace and recheck per RN managed protocol.    Hypocalcemia  * Given IV calcium 8/23.  Improved with replacement.    Lines, etc.  * Midline placed 8/24.  Continue midline.       Diet: Regular Diet Adult  Room Service    DVT Prophylaxis: Enoxaparin (Lovenox) SQ  Leal Catheter: Not present  Lines: PRESENT             Cardiac Monitoring: None  Code Status: Full Code      Clinically Significant Risk Factors        # Hypokalemia: Lowest K = 3.2 mmol/L in last 2 days, will replace as needed       # Hypoalbuminemia: Lowest albumin = 2.4 g/dL at 8/28/2023  5:25 AM, will monitor as appropriate            # Overweight: Estimated body mass index is 26.33 kg/m  as calculated from the following:    Height as of this encounter: 1.575 m (5' 2\").    Weight as of this encounter: 65.3 kg (143 lb 15.4 oz).             Disposition Plan    "   Expected Discharge Date: 08/30/2023,  3:00 PM                Zbigniew Arreaga MD  Hospitalist Service  [facility]  Securely message with Po (more info)  Text page via [employer] Paging/Directory   ______________________________________________________________________    Interval History   Melanie Cox was seen today. Doesn't feel very good. Has abdominal discomfort and has had two episodes of diarrhea this morning. Denies fevers, chest pain, nausea. Shortness of breath improved. Continues to have a cough. Discussed transitioning off IV pain medication to oral morphine, she seemed agreeable at the time but subsequently was quite agitated and demanding with nursing staff later in the afternoon/evening.    Physical Exam   Vital Signs: Temp: 99  F (37.2  C) Temp src: Oral BP: (!) 147/79 Pulse: 96   Resp: 18 SpO2: 93 % O2 Device: None (Room air) Oxygen Delivery: 3 LPM  Weight: 143 lbs 15.37 oz    Constitutional: awake, alert, cooperative, no apparent distress, laying in the hospital bed  Respiratory: no increased work of breathing, diminished at the bases  Cardiovascular: regular rate and rhythm, normal S1 and S2, no murmur noted  GI: normal bowel sounds, soft, non-distended, mild tenderness  Skin: warm, dry  Musculoskeletal: no lower extremity pitting edema present  Neurologic: awake, alert, answers questions appropriately, moves all extremities    Medical Decision Making       40 MINUTES SPENT BY ME on the date of service doing chart review, history, exam, documentation & further activities per the note.      Data     I have personally reviewed the following data over the past 24 hrs:    5.3  \   9.1 (L)   / 250     140 104 6.2 (L) /  136 (H)   3.8 26 0.41 (L) \     ALT: 36 AST: 12 AP: 127 (H) TBILI: 0.5   ALB: 2.4 (L) TOT PROTEIN: 5.9 (L) LIPASE: N/A     Procal: 0.18 (H) CRP: N/A Lactic Acid: N/A

## 2023-08-28 NOTE — PROGRESS NOTES
Antimicrobial Stewardship Team Note    Antimicrobial Stewardship Program - A joint venture between [facility] and [facility] [employer] to optimize antibiotic management.     Patient: Melanie Cox  MRN: [id number]  Allergies: Brilliant green, Diatrizoate meglumine [diatrizoate], Gentian violet, Ibuprofen, Nsaids, and Proflavine    Brief Summary: Melanie Cox is a 62 year old female admitted on 8/23/23 transferred from OSH with fevers and altered mental status, concern for septic shock. PMH significant for substance abuse, chronic back pain s/p fusion, GERD, COPD, HFpEF, and chronic right hip wound.    She was started on Zosyn, doxycycline, and vancomycin at OSH on 8/22, vancomycin stopped 8/23 and continues on Zosyn, completed 5 day course of doxycycline 8/27. CT a/p 8/22 with multifocal left lower lobe pneumonia. CXR with no evidence of acute cardiopulmonary disease. WBC count 24.6 on admission, normal at 5.3 today. Procalcitonin 2.65 on 8/23, 0.18 today. Last fever 8/26pm, afebrile since then. Requiring 2-3 LPM O2. MRSA nares PCR negative, blood cultures negative, sputum culture with 1+ normal adam.         Active Anti-infective Medications   (From admission, onward)                 Start     Stop    08/23/23 0500  vancomycin in dextrose  1,000 mg,   Intravenous,   200 mL/hr,   EVERY 12 HOURS        Community Acquired Pneumonia       --    08/23/23 0200  piperacillin-tazobactam  3.375 g,   Intravenous,   EVERY 6 HOURS        Community Acquired Pneumonia       --                  Assessment: Possible CAP  Patient presented from OSH with worsening sepsis requiring brief ICU admission for pressors, currently on IMC unit. Leukocytosis and fever resolved, still requiring 3 LPM O2. Today is day 6 of antibiotics, completed 5 day course of doxycycline yesterday. Sputum culture grew normal adam and CXR showed clear lungs although CT a/p noted multifocal pneumonia. Reasonable to complete 7 day  course.    Recommendations:  Complete 7 day course of Zosyn on 8/29.    Discussed with ID Staff MD Missy Posada, PharmD, BCIDP    Vital Signs/Clinical Features:  Vitals         08/26 0700  08/27 0659 08/27 0700  08/28 0659 08/28 0700  08/28 1322   Most Recent      Temp ( F) 99.6 -  100.7    98 -  99.2    98.7 -  99     99 (37.2) 08/28 1137    Pulse 96 -  111    59 -  109    93 -  96     96 08/28 1137    Resp 18 -  28    14 -  24      18     18 08/28 1137    /86 -  156/111    109/90 -  169/95    147/79 -  152/80     147/79 08/28 1137    SpO2 (%) 90 -  94    90 -  97      93     93 08/28 1137            Labs  Estimated Creatinine Clearance: 126.2 mL/min (A) (based on SCr of 0.41 mg/dL (L)).  Recent Labs   Lab Test 08/23/23  0114 08/24/23  0926 08/25/23  0506 08/26/23  1010 08/28/23  0525   CR 0.59 0.53 0.45* 0.41* 0.41*       Recent Labs   Lab Test 08/23/23  0114 08/24/23  0926 08/25/23  0506 08/26/23  1447 08/28/23  0525   WBC 24.6* 13.7* 11.3* 12.7* 5.3   HGB 10.2* 8.4* 8.1* 9.2* 9.1*   HCT 33.4* 29.4* 27.6* 31.2* 30.9*   MCV 87 91 88 87 87    174 174 211 250       Recent Labs   Lab Test 08/23/23  0114 08/24/23  0926 08/25/23  0506 08/26/23  1010 08/28/23  0525   BILITOTAL 1.4* 0.5 0.7 0.7 0.5   ALKPHOS 162* 135* 143* 136* 127*   ALBUMIN 2.8* 2.6* 2.6* 2.6* 2.4*   AST 37 21 25 24 12   * 95* 74* 60* 36       Recent Labs   Lab Test 08/23/23  0114 08/23/23  1641 08/25/23  0616 08/26/23  1539 08/27/23  0927 08/28/23  0525   PCAL 2.65*  --   --   --   --  0.18*   LACT 1.5 1.4 1.0 0.6* 0.8  --              Culture Results:  7-Day Micro Results       Procedure Component Value Units Date/Time    Respiratory Aerobic Bacterial Culture with Gram Stain [[id number]] Collected: 08/24/23 0001    Order Status: Completed Lab Status: Final result Updated: 08/26/23 1018    Specimen: Sputum from Expectorate      Culture 1+ Normal adam     Gram Stain Result <10 Squamous epithelial cells/low  power field      >25 PMNs/low power field      1+ Mixed adam    Respiratory Aerobic Bacterial Culture with Gram Stain [[id number]] Collected: 08/23/23 2048    Order Status: Completed Lab Status: Final result Updated: 08/23/23 2342    Specimen: Sputum from Expectorate      Culture >10 Squamous epithelial cells/low power field indicates oral contamination. Please recollect.     Gram Stain Result >10 Squamous epithelial cells/low power field      <25 PMNs/low power field      No organisms seen    Blood Culture Hand, Left [[id number]]  (Normal) Collected: 08/23/23 0240    Order Status: Completed Lab Status: Final result Updated: 08/28/23 0447    Specimen: Blood from Hand, Left      Culture No Growth    Blood Culture Wrist, Right [[id number]]  (Normal) Collected: 08/23/23 0240    Order Status: Completed Lab Status: Final result Updated: 08/28/23 0447    Specimen: Blood from Wrist, Right      Culture No Growth    Narrative:      Only an Aerobic Blood Culture Bottle was collected, interpret results with caution.       Methicillin Resist/Sens S. aureus PCR [[id number]] Collected: 08/23/23 0205    Order Status: Completed Lab Status: Final result Updated: 08/23/23 0548    Specimen: Swab from Nose      MRSA Target DNA Negative     SA Target DNA Negative    Narrative:      The [employer]  Xpert SA Nasal Complete assay performed in the [employer]  Dx System is a qualitative in vitro diagnostic test designed for rapid detection of Staphylococcus aureus (SA) and methicillin-resistant Staphylococcus aureus (MRSA) from nasal swabs in patients at risk for nasal colonization. The test utilizes automated real-time polymerase chain reaction (PCR) to detect MRSA/SA DNA. The Xpert SA Nasal Complete assay is intended to aid in the prevention and control of MRSA/SA infections in healthcare settings. The assay is not intended to diagnose, guide or monitor treatment for MRSA/SA infections, or provide results of susceptibility to  methicillin. A negative result does not preclude MRSA/SA nasal colonization.                                     Imaging: CT ABDOMEN PELVIS STONE PROTOCOL WO    Result Date: 8/22/2023  For Patients:  As a result of the 21st Century Cures Act, medical imaging exams and procedure reports are released immediately into your electronic medical record.  You may view this report before your referring provider.  If you have questions, please contact your health care provider. INDICATION: Abdominal pain, confusion. TECHNIQUE: CT abdomen and pelvis without contrast. COMPARISON: June 19, 2023. FINDINGS: Lower chest: Multifocal left lower lobe pneumonia. Moderate hiatal hernia. Liver: Normal in size and attenuation. No suspicious masses. Gallbladder and bile ducts: Cholecystectomy with reservoir effect. Pancreas: Unremarkable. No mass or inflammation. Spleen: Splenic granulomas. Normal in size. No masses. Adrenal glands: Normal in size. No nodules. Kidneys: Normal in size. No suspicious masses, stones, or hydronephrosis. GI tract: No bowel obstruction. Vasculature: Moderate aortoiliac arterial calcifications. Abdominal aorta is normal in caliber. Lymph nodes: No lymphadenopathy. Peritoneum/Abdominal Wall: Unremarkable. No sign of mass or infiltration. No free air or significant free fluid. Pelvis: Limited evaluation secondary to streak artifact. Moderately distended bladder. Hysterectomy. Bones: L2-S1 posterior hardware fixation causing streak artifact which limits evaluation. Stable multilevel compression deformities.     Limited evaluation, particularly of the bowel, secondary to motion artifact. Multifocal left lower lobe pneumonia. No acute intra-abdominal/pelvic abnormality or significant interval change when compared to prior study. Chronic findings as above. Please note that all CT scans at this facility use dose modulation, iterative reconstruction, and/or weight-based dosing when appropriate to reduce radiation dose to  as low as reasonably achievable. Dictated by Renny York MD @ 8/22/2023 6:29:39 PM (Electronically Signed)    XR CHEST 1 VIEW PORTABLE    Result Date: 8/22/2023  For Patients:  As a result of the 21st Century Cures Act, medical imaging exams and procedure reports are released immediately into your electronic medical record.  You may view this report before your referring provider.  If you have questions, please contact your health care provider. INDICATIONS: Confusion. TECHNIQUE: Chest 1 AP view. COMPARISON: Chest radiograph 06/05/2023. FINDINGS: Patient is rotated to the left. No evidence of pneumothorax or pleural effusion. Calcified granuloma in the right lower lung zone, as before. Lungs are otherwise clear. Cardiac and mediastinal contours are stable allowing for differences in imaging technique. Unchanged elevation of the right hemidiaphragm. Upper abdomen and osseous structures as imaged show no acute abnormality.    No evidence of acute cardiopulmonary disease. Dictated by Murray Bush MD @ 8/22/2023 6:11:34 PM Dictated by: Murray Bush MD @ 08/22/2023 18:11:40 (Electronically Signed)    CT Cervical Spine w/o Contrast    Result Date: 8/22/2023  For Patients:  As a result of the 21st Century Cures Act, medical imaging exams and procedure reports are released immediately into your electronic medical record.  You may view this report before your referring provider.  If you have questions, please contact your health care provider. Indication: Confusion. Possible fall. Technique: CT of the cervical spine performed without IV contrast. Comparison: CT cervical spine 06/19/2023. Findings: Images are severely degraded by patient motion. No gross appearing fracture or traumatic malalignment moderate multilevel disc height loss and desiccation. Grade 1 anterolisthesis C3 on C4. Similar appearing superior endplate deformity of T2. Moderate multilevel spondylosis with varying degrees spinal canal or neural  foraminal stenosis. Bilateral pleural apical thickening small calcifications. No prevertebral soft tissue swelling. Impression: 1. Images are severely degraded by motion artifact. 2. No definite fracture or traumatic malalignment. 3. Moderate multilevel spondylosis. Please note that all CT scans at this facility use dose modulation, iterative reconstruction, and/or weight-based dosing when appropriate to reduce radiation dose to as low as reasonably achievable. Dictated by Per Tapia MD @ 8/22/2023 6:07:56 PM (Electronically Signed)    CT Head w/o Contrast    Result Date: 8/22/2023  For Patients:  As a result of the 21st [email or web address] Cures Act, medical imaging exams and procedure reports are released immediately into your electronic medical record.  You may view this report before your referring provider.  If you have questions, please contact your health care provider. Indication : Altered mental status. Technique : CT of the brain without intravenous contrast. Comparison: CT head 06/19/2023 Findings: Mild motion artifact. No acute blurring of the gray-white differentiation. There is no intracranial hemorrhage. The ventricles are proportionate to the cerebral sulci. The 4th ventricle is midline. Basal cisterns appear patent. No abnormal extra-axial fluid collection identified. Mild parenchymal volume loss. There is mild patchy periventricular hypodensity, favored to represent chronic ischemic microvascular disease. There is no intracranial mass, mass effect or midline shift identified. No depressed calvarial fracture. Impression: 1. No acute intracranial process. 2. Mild chronic ischemic microvascular disease. Please note that all CT scans at this facility use dose modulation, iterative reconstruction, and/or weight-based dosing when appropriate to reduce radiation dose to as low as reasonably achievable. Dictated by Per Tapia MD @ 8/22/2023 6:04:16 PM (Electronically Signed)

## 2023-08-28 NOTE — PLAN OF CARE
Assessment and plan  Principal hospital problem: LLL pneumonia with septic shock and acute hypoxic respiratory failure.  Coping/psychosocial: mood swings, agitated.  Cognitive: alert and oriented x 4.   Vital signs: stable on 3 LPM.  Cardiovascular: denied chest pain.  Pulmonary: frequent cough, dyspnea on exertion, crackles present.  Musculoskeletal: moderately impaired.  Pain: chronic pain managed with dilaudid q3h, attempting to wean dilaudid and replace with morphine but patient is not agreeable.  IV Access/drains: IV abx, saline locked.   Wound/Skin: right hip wound site, dressing changed.  GI/: incontinent of bowel and bladder, multiple soft bm today, plan for C-diff stool test next time patient has a bm.  Leal catheter: not present.  Diet: Reg diet, thin liquids.   Activity: assist of 1 with gait belt and walker.  Safety: alarms on, safety rounds completed.    End of shift summary: patient requested IV dilaudid for pain around 1500. Per provider, offered patient oral morphine 15 mg. Patient refused and stated that she is willing to try it at bedtime, but right now she wants the dilaudid. Educated patient on morphine, at which patient became very agitated, however, agreed to try morphine. About 15-20 minutes later, went to see patient along with the oncoming RN who was taking over patient's care. Patient immediately started demanding IV dilaudid stating that the morphine did not help the pain at all. Attempted to re-educate the patient on time required for the medication to start working, at which patient became very hostile and verbally abusive. Provider and charge RN aware. Discharge pending safe disposition plan.

## 2023-08-29 NOTE — PROGRESS NOTES
[facility]    Medicine Progress Note - Hospitalist Service    Date of Admission:  8/23/2023    Assessment & Plan   Melanie Cox is a 62 year old female with a past medical history significant for COPD; HTN; CHF (HFpEF); chronic spine/disc disease with chronic pain syndrome on opioids; polysubstance abuse; chronic pressure ulcer; and GERD; who presented to OSH 8/22/2023 with fevers with AMS and found to be in septic shock with signs of LLL pneumonia.  Transferred to [facility] ICU 8/22/2023 for management.  Patient was treated with antibiotics and pressors and subsequently improved.  Patient was transferred to the IM service 8/23/2023.    LLL pneumonia with septic shock and acute hypoxic respiratory failure  COPD with ongoing tobacco use  Possible UTI  Initial presentation to OSH as above. Also had UA with 11-25 WBC, small blood, small LE though mod epithelial cells, blood.  Transferred to [facility] ICU 8/22/23.  Started on vanco, pipericillin-tazobactam and doxycycline on admit. MRSA nasal PCR negative. BC's  NGTD.   On 8/23, weaned off pressors and transferred to IM team. Vanco stopped. Later in the afternoon, became hypotensive again requiring more fluids and pressors, weaned off in the evening.  Gradually improved and transferred out of ICU.  Vancomycin discontinued on 8/23/23.  Completed 5 day course of doxycycline on 8/27/23.  Will complete a 7 day course of Zosyn tonight.  Currently on 2 lpm of supplemental oxygen, wean as tolerated.  Continue Incruse Ellipta (formulary substitute for spiriva) and PRN DuoNebs.  Blood cultures negative to date.  Sputum culture with normal adam.    Acute septic, metabolic and toxic encephalopathy, improved  Presented with confusion and decreased LOC. EtOH level in ED 0.048. CT head negative for acute findings.  Mental status improved. She has been conversant, A&O. Drowsy following opioids. Neuro intact.  Re-orient as needed.  Maintain normal day/night,  sleep/wake cycles.  Minimize sedating medications as able.    Diarrhea  Abdominal pain  Multiple loose stools on morning of 8/28/23. Also complains of abdominal pain. No fevers. WBC with in normal limits. Has been on Zosyn for the past 7 days.  Stool for c.diff ordered, but she has not had any further bowel movements.  CT abdomen/pelvis ordered today for further evaluation.    Elevated LFTs, unclear etiology, question hypoperfusion from sepsis, alcohol use  * Patient is s/p cholecystectomy.  Continue to treat other issues as noted.  LFTs improved, monitor intermittently.    Hypertension (benign essential)  Chronic CHF (HFpEF)  [PTA: metoprolol 50 mg BID.]  * ECHO 3/2022 showed LV EF 73% with normal RV function.   * BNP 8/22/23 in ED elevated at 10,700, not clinically volume up. Issues with septic shock as noted. Metoprolol held on admit.  BP's more elevated 8/25, PTA metoprolol restarted.  Blood pressure better controlled, continue to monitor.  PRN IV hydralazine available for high blood pressure.  Monitor I&O and daily weights.    Anemia, suspect dilutional and/or chronic  * Baseline hemoglobin around 14-15 earlier this year. Hgb 10.2 on admit 8/23. No overt clinical signs of major bleeding.  Hemoglobin 8.5 on 8/29/23, recheck in AM.    Coagulopathy, suspect due to decreased PO intake  * INR 1.45 on admit 8/23.  INR improved to 1.18 on 8/25/23.    Alcohol use  On 8/23, patient reported periodic drinking at home, denied daily use or history of withdrawal. Etoh level on admission 0.048 as above. No overt withdrawal noted.  Continue to monitor clinically.    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 Last seen 7/31/2023.  * On admit, wound did not appears acutely infected.  WOC consulted 8/23.  Continue local wound cares per WOC RN.    Chronic pain syndrome  Per chart review, there was some concern  "for misuse. Recently switched from oxycodone to morphine IR at appt 8/9. PTA: morphine 15mg q4 hours PRN, however also was taking prior oxycodone until it runs out. PTA also on lidocaine patch. Gabapentin on med list but patient not taking.  Continue lidocaine 4% patch q24h; PRN morphine 15 mg q4h; PRN IV hydromorphone; minimize opioids as able.  Discussed evaluating abdominal pain as noted above. If no significant findings, plan to decrease IV pain medications.  Pain team consulted, appreciate their assistance.    GERD  Continue PTA PPI and sucralfate.    Vulnerable adult  * Per report, arrived to ED covered in stool. Chart review indicated prior admission after laying on the floor for several days, concern for unsafe living situation and history of multiple vulnerable adult reports filed.   SW following.  TCU recommended, but patient currently reluctant.    Weakness and physical deconditioning due to multiple acute and chronic medical issues  PT and OT consulted, recommending TCU.  Encouraged participation with therapies in the hospital and consideration of TCU at the time of discharge.    Hypokalemia  Hypomagnesemia  Hypophosphatemia  Replace and recheck per RN managed protocol.    Hypocalcemia  * Given IV calcium 8/23.  Resolved with replacement.    Lines, etc.  * Midline placed 8/24.  Continue midline.       Diet: Regular Diet Adult  Room Service    DVT Prophylaxis: Enoxaparin (Lovenox) SQ  Leal Catheter: Not present  Lines: PRESENT             Cardiac Monitoring: None  Code Status: Full Code      Clinically Significant Risk Factors            # Hypomagnesemia: Lowest Mg = 1.6 mg/dL in last 2 days, will replace as needed   # Hypoalbuminemia: Lowest albumin = 2.4 g/dL at 8/28/2023  5:25 AM, will monitor as appropriate            # Overweight: Estimated body mass index is 25.69 kg/m  as calculated from the following:    Height as of this encounter: 1.575 m (5' 2\").    Weight as of this encounter: 63.7 kg (140 lb " 6.9 oz).             Disposition Plan      Expected Discharge Date: 08/31/2023,  3:00 PM                Zbigniew Arreaga MD  Hospitalist Service  [facility]  Securely message with [employer] (more info)  Text page via [employer] Paging/Directory   ______________________________________________________________________    Interval History   Melanie Cox was seen today. Still doesn't feel good. Main complaint is abdominal pain. Some nausea, but no emesis. No BM since the two loose stools yesterday morning. Shortness of breath improved. Continues to have a productive cough. Denies fevers, chest pain. Updated her significant other by phone today.    Physical Exam   Vital Signs: Temp: 97.7  F (36.5  C) Temp src: Oral BP: 113/78 Pulse: 91   Resp: 20 SpO2: 100 % O2 Device: Nasal cannula Oxygen Delivery: 2 LPM  Weight: 140 lbs 6.93 oz    Constitutional: awake, alert, cooperative, appears uncomfortable, laying in the hospital bed  Respiratory: no increased work of breathing, diminished at the bases  Cardiovascular: regular rate and rhythm, normal S1 and S2, no murmur noted  GI: normal bowel sounds, soft, non-distended, upper abdominal tenderness  Skin: warm, dry  Musculoskeletal: no lower extremity pitting edema present  Neurologic: awake, alert, answers questions appropriately, moves all extremities    Medical Decision Making       60 MINUTES SPENT BY ME on the date of service doing chart review, history, exam, documentation & further activities per the note.      Data     I have personally reviewed the following data over the past 24 hrs:    5.6  \   8.5 (L)   / 326     137 100 6.7 (L) /  128 (H)   3.7 26 0.48 (L) \     ALT: N/A AST: N/A AP: N/A TBILI: N/A   ALB: N/A TOT PROTEIN: N/A LIPASE: 17     Trop: 13 BNP: N/A       Imaging results reviewed over the past 24 hrs:   Recent Results (from the past 24 hour(s))   XR Chest Port 1 View    Narrative    EXAM: XR CHEST PORT 1 VIEW  LOCATION: [facility]  DATE: 8/28/2023    INDICATION: chest pain  COMPARISON: None.      Impression    IMPRESSION: Heart size within normal limits. Retrocardiac atelectasis or consolidation and small left pleural effusion. Calcified right basilar granuloma. No visible pneumothorax. Partially imaged right shoulder arthroplasty. Calcified joint bodies in   the left axillary recess. IV catheter tubing ejects over the left axilla. Partially imaged lumbar spine postoperative change.

## 2023-08-29 NOTE — CONSULTS
"      Initial Psychiatric Consult   Consult date: August 29, 2023         Reason for Consult, requesting source:    Substance use and depressed mood.     Requesting source: Shawna Graham    Labs and imaging reviewed. Discussed with nursing staff.         HPI:   Melanie Cox is a 62 year old female with a past medical history significant for COPD; HTN; CHF; chronic spine/disc disease with chronic pain syndrome on opioids; polysubstance abuse; h/o opiate overdose, chronic pressure ulcers on right hip; and GERD; who presented to an emergency department in [facility] on 8/22/2023 with fevers and altered mental status. She was found to be in septic shock with signs of LLL pneumonia and acute hypoxic respiratory failure.  Melanie was transferred to [facility] ICU 8/22/2023 for management. Melanie was treated with antibiotics and pressors, stabilized, and then transferred to [facility] on 8/23/2022.  Record review notes that outpatient provider, [facility]. [facility], has recommended that patient transition to Suboxone and that patient was willing to participate in evaluation.    Today, Melanie is irritated that she cannot have more pain medications. She reports pain in her knees, shoulders, back, and hands. She rolled her eyes when I asked if anything had been helpful for pain relief. She wants Dilaudid and morphine as she says they work best when taken together. I attempted to ask about her sleep, she became agitated and told me to \"stop asking stupid questions.\" She denies that her mood has been impacted by her pain. She denies anxiety. She states that anyone in her level of pain would feel helpless and sad \"all the time.\" She denies suicidal thoughts. She says she doesn't need me to be there and says \"I don't want to answer anymore of your questions.\" She wants to go home and doesn't want to go to a TCU. She asked me to leave the room and tell her nurse that she needed more pain medications.          Past Psychiatric " History:   Record reviewed, no significant past psychiatric history noted.        Substance Use and History:   Patient endorses periodic drinking at home. ETOH level at admission was 0.048. Utox positive for THC and Opiates. Record review indicates concerns for misuse of oxycodone and morphine. Outpatient provider notes have recommended transitioning patient to Suboxone.          Past Medical History:   PAST MEDICAL HISTORY:   Past Medical History:   Diagnosis Date    Chronic pain syndrome     On chronic narcotics.    COPD (chronic obstructive pulmonary disease) (H)     DDD (degenerative disc disease), lumbar     Gastroesophageal reflux disease without esophagitis     Heart failure with preserved ejection fraction, NYHA class I (H)     High risk social situation     H/o EtOH abuse along with her significant other    HTN (hypertension)     Pressure ulcer, hip     Due to lying on floor continuously       PAST SURGICAL HISTORY:   Past Surgical History:   Procedure Laterality Date    LUMBAR FUSION               Family History:   FAMILY HISTORY:   Family History   Problem Relation Age of Onset    Heart Disease Father            Social History:   SOCIAL HISTORY:   Social History     Tobacco Use    Smoking status: Every Day     Types: Cigarettes    Smokeless tobacco: Not on file   Substance Use Topics    Alcohol use: Not on file       Patient resides with significant other.          Physical ROS:   The 10 point Review of Systems is negative other than noted in the HPI or here.         Medications:      enoxaparin ANTICOAGULANT  40 mg Subcutaneous Q24H    folic acid  1 mg Oral Daily    Lidocaine  1 patch Transdermal Q24H    metoprolol tartrate  50 mg Oral BID    multivitamin, therapeutic  1 tablet Oral Daily    nystatin   Topical BID    pantoprazole  40 mg Oral QAM AC    piperacillin-tazobactam  3.375 g Intravenous Q6H    sodium chloride (PF)  10 mL Intracatheter Q8H    sucralfate  1 g Oral 4x Daily    vitamin B1  100 mg  Oral Daily    umeclidinium  1 puff Inhalation Daily              Allergies:     Allergies   Allergen Reactions    Brilliant Green     Diatrizoate Meglumine [Diatrizoate]      rash    Gentian Violet     Ibuprofen     Nsaids      Bleeding    Proflavine           Labs:     Recent Results (from the past 48 hour(s))   Potassium    Collection Time: 08/27/23 11:52 AM   Result Value Ref Range    Potassium 3.5 3.4 - 5.3 mmol/L   Magnesium    Collection Time: 08/28/23  5:25 AM   Result Value Ref Range    Magnesium 1.7 1.7 - 2.3 mg/dL   Phosphorus    Collection Time: 08/28/23  5:25 AM   Result Value Ref Range    Phosphorus 3.3 2.5 - 4.5 mg/dL   CBC with platelets    Collection Time: 08/28/23  5:25 AM   Result Value Ref Range    WBC Count 5.3 4.0 - 11.0 10e3/uL    RBC Count 3.54 (L) 3.80 - 5.20 10e6/uL    Hemoglobin 9.1 (L) 11.7 - 15.7 g/dL    Hematocrit 30.9 (L) 35.0 - 47.0 %    MCV 87 78 - 100 fL    MCH 25.7 (L) 26.5 - 33.0 pg    MCHC 29.4 (L) 31.5 - 36.5 g/dL    RDW 17.8 (H) 10.0 - 15.0 %    Platelet Count 250 150 - 450 10e3/uL   Comprehensive metabolic panel    Collection Time: 08/28/23  5:25 AM   Result Value Ref Range    Sodium 140 136 - 145 mmol/L    Potassium 3.8 3.4 - 5.3 mmol/L    Chloride 104 98 - 107 mmol/L    Carbon Dioxide (CO2) 26 22 - 29 mmol/L    Anion Gap 10 7 - 15 mmol/L    Urea Nitrogen 6.2 (L) 8.0 - 23.0 mg/dL    Creatinine 0.41 (L) 0.51 - 0.95 mg/dL    Calcium 8.5 (L) 8.8 - 10.2 mg/dL    Glucose 136 (H) 70 - 99 mg/dL    Alkaline Phosphatase 127 (H) 35 - 104 U/L    AST 12 0 - 45 U/L    ALT 36 0 - 50 U/L    Protein Total 5.9 (L) 6.4 - 8.3 g/dL    Albumin 2.4 (L) 3.5 - 5.2 g/dL    Bilirubin Total 0.5 <=1.2 mg/dL    GFR Estimate >90 >60 mL/min/1.73m2   Procalcitonin    Collection Time: 08/28/23  5:25 AM   Result Value Ref Range    Procalcitonin 0.18 (H) <0.05 ng/mL   EKG 12-lead, tracing only    Collection Time: 08/28/23  8:32 PM   Result Value Ref Range    Systolic Blood Pressure  mmHg    Diastolic Blood  "Pressure  mmHg    Ventricular Rate 117 BPM    Atrial Rate 117 BPM    NE Interval 152 ms    QRS Duration 60 ms     ms    QTc 446 ms    P Axis 54 degrees    R AXIS 20 degrees    T Axis 37 degrees    Interpretation ECG       Sinus tachycardia  Otherwise normal ECG  No previous ECGs available     Troponin T, High Sensitivity    Collection Time: 08/29/23 12:08 AM   Result Value Ref Range    Troponin T, High Sensitivity 13 <=14 ng/L   Magnesium    Collection Time: 08/29/23  5:42 AM   Result Value Ref Range    Magnesium 1.6 (L) 1.7 - 2.3 mg/dL   Phosphorus    Collection Time: 08/29/23  5:42 AM   Result Value Ref Range    Phosphorus 3.6 2.5 - 4.5 mg/dL   CBC with platelets    Collection Time: 08/29/23  5:42 AM   Result Value Ref Range    WBC Count 5.6 4.0 - 11.0 10e3/uL    RBC Count 3.35 (L) 3.80 - 5.20 10e6/uL    Hemoglobin 8.5 (L) 11.7 - 15.7 g/dL    Hematocrit 29.2 (L) 35.0 - 47.0 %    MCV 87 78 - 100 fL    MCH 25.4 (L) 26.5 - 33.0 pg    MCHC 29.1 (L) 31.5 - 36.5 g/dL    RDW 18.2 (H) 10.0 - 15.0 %    Platelet Count 326 150 - 450 10e3/uL   Basic metabolic panel    Collection Time: 08/29/23  5:42 AM   Result Value Ref Range    Sodium 137 136 - 145 mmol/L    Potassium 3.7 3.4 - 5.3 mmol/L    Chloride 100 98 - 107 mmol/L    Carbon Dioxide (CO2) 26 22 - 29 mmol/L    Anion Gap 11 7 - 15 mmol/L    Urea Nitrogen 6.7 (L) 8.0 - 23.0 mg/dL    Creatinine 0.48 (L) 0.51 - 0.95 mg/dL    Calcium 8.8 8.8 - 10.2 mg/dL    Glucose 132 (H) 70 - 99 mg/dL    GFR Estimate >90 >60 mL/min/1.73m2          Physical and Psychiatric Examination:     /62 (BP Location: Right arm)   Pulse 91   Temp 97.7  F (36.5  C) (Oral)   Resp 20   Ht 1.575 m (5' 2\")   Wt 63.7 kg (140 lb 6.9 oz)   SpO2 95%   BMI 25.69 kg/m    Weight is 140 lbs 6.93 oz  Body mass index is 25.69 kg/m .    Physical Exam:  I have reviewed the physical exam as documented by by the medical team and agree with findings and assessment and have no additional findings to add " at this time.    Mental Status Exam:    Appearance: awake, alert, dressed in hospital scrubs, appeared older than stated age, and disheveled   Attitude:  slightly uncooperative  Eye Contact:  fair  Mood:  angry  Affect:  mood congruent, labile  Speech:  clear, coherent and mumbling  Psychomotor Behavior:  no evidence of tardive dyskinesia, dystonia, or tics and sitting in recliner chair   Thought Process:  goal oriented, focused on receiving pain medications   Associations:  no loose associations  Thought Content:  no evidence of suicidal ideation or homicidal ideation, no evidence of psychotic thought, no auditory hallucinations present, and no visual hallucinations present  Insight:  fair  Judgement:  fair  Oriented to:  time, person, and place  Attention Span and Concentration:  fair  Recent and Remote Memory:  fair  Gait and Station: not observed as patient was sitting in recliner                DSM-5 Diagnosis:   311 (F32.9) Unspecified Depressive Disorder           Assessment:   Melanie Cox is a 62 year old female with a past medical history significant for COPD; HTN; CHF; chronic spine/disc disease with chronic pain syndrome on opioids; polysubstance abuse; h/o opiate overdose, chronic pressure ulcers on right hip; and GERD; who presented to an emergency department in [facility] on 8/22/2023 with fevers and altered mental status. She was found to be in septic shock with signs of LLL pneumonia and acute hypoxic respiratory failure.  Melanie was transferred to [facility] ICU 8/22/2023 for management and was stabilized before transferring to [facility] on 8/23/2022. Given chronic pain and documented hyper analgesic syndrome surrounding her narcotics, her primary provider has recommended transitioning Melanie to Suboxone. Melanie endorses feeling hopeless and sad given ongoing pain yet was not willing to further discuss this during assessment. She perceives her readiness to discharge and return home despite recommendations to  transition to skilled nursing facility or TCU. Following additional stabilization and pain management, Melanie could  benefit from outpatient mental health resources as chronic pain can take a mental toll. At this time, I support Melanie transitioning to TCU or skilled nursing facility and following up with her established outpatient providers.           Summary of Recommendations:   At this time, patient declines additional psychiatric supports. Discussed that if she would like to meet with psychiatry in the future, the consult service would be available.  Outpatient notes indicate that is has been suggested that the patient move towards starting Suboxone and that patient has previously expressed willingness to do this. Recommendation to follow-up with Suboxone provider at [facility] for evaluation and initiation.   I support recommendations for patient to transition to TCU or skilled nursing facility. At this time, patient is psychiatrically cleared to do so.       MERRITT [facility]    Consult/Liaison Psychiatry   [facility]    Contact information available via [facility] Paging/Directory  If I am not available, then [facility] CL line ([phone]) should know who is covering our consult service.

## 2023-08-29 NOTE — PLAN OF CARE
Goal Outcome Evaluation:      Plan of Care Reviewed With: patient    Overall Patient Progress: no changeOverall Patient Progress: no change    Orientations: A/Ox4  Vitals/Pain: VSS on RA. Dilaudid and morphine for pain management   LS: crackles in bases   Lines/Drains: R midline, SL   Skin/Wounds: R hip pressure injury, CDI. Blanchable redness on coccyx, mepi in place.   GI/: BM- BS+, incontinent of urine   Labs: Troponin (13) K+(3.7) mag (1.6) phos (3.6)   Ambulation/Assist: A2 lift   Sleep Quality: Fair, woke frequently overnight   Plan: Stool sample still needs to be collected for C.diff rule out, no BM overnight.

## 2023-08-29 NOTE — PROGRESS NOTES
Care Management Follow Up    Length of Stay (days): 6    Expected Discharge Date: 08/30/2023     Concerns to be Addressed: discharge planning     Patient plan of care discussed at interdisciplinary rounds: Yes    Anticipated Discharge Disposition: Skilled Nursing Facility, Home Care     Anticipated Discharge Services:    Anticipated Discharge DME:      Patient/family educated on Medicare website which has current facility and service quality ratings:    Education Provided on the Discharge Plan: Yes  Patient/Family in Agreement with the Plan:      Referrals Placed by CM/SW:    Private pay costs discussed: Not applicable    Additional Information:  Writer made phone call to Artur with the [facility] to follow up on referral for [facility] [place]. Writer left message with Artur for a call back on the status of the referrals.    Addendum: Writer sent out additional referrals in the patient's area. SW will continue to follow for placement.    [facility]  Care Management

## 2023-08-29 NOTE — PLAN OF CARE
"Summary: [id number] 8/28/23 LLL pneumonia with sepsis  Orientation: A/Ox4 forgetful   Activity Level: Ax2 lift not OOB  Fall Risk: yes  Behavior & Aggression Tool Color: yellow, agitated at times regarding pain medication  Pain Management: PRN Dilaudid given X3 and Morphine given X2  ABNL VS/O2: tachy and HTN on 2L NC  ABNL Lab/BG: n/a  Diet: reg  Bowel/Bladder: incontinent  Drains/Devices: L midline saline locked  Tests/Procedures for next shift: 8/29 1330 PT  Anticipated DC date: TBD  Other Important Info: upon meeting the pt at 1530 pt asked for PRN IV Dilaudid. Pt had received PRN Morphine at 1515, education provided on taking too many narcotics too close together and asked the pt if they would be able to wait a bit before another PRN pain medication. Pt refused waiting and began to yell at RN saying \"I don't need another lecture\"  and \"I don't care\" and \"I'm going to tell your higher ups if you don't give me my meds\". RN proceeded to page MD Arreaga on the situation where RN was told to educate the pt further and that he would discuss this with the pt further tomorrow. Pt then proceeded to try and get out of bed and said \"if your not bringing me my meds, then I will get them myself\". RN spoke with charge nurse who advised that 0.3 mg IV Dilaudid be given to the pt. When RN assessed pts pain the pt stated that they have pain all over their body.     "

## 2023-08-29 NOTE — PROGRESS NOTES
Shift was unremarkable. Patient ambulated to the bathroom with assist X 1/FWW/GB.  Pain is always severe, wants dilaudid IV around the clock.  CT scan was ordered by Dr. Arreaga for abdominal pain.   VSS; on 2-3 LPM supplemental oxygen.  Incontinent of urine X 1.

## 2023-08-30 NOTE — PROGRESS NOTES
Care Management Follow Up    Length of Stay (days): 7    Expected Discharge Date: 08/31/2023     Concerns to be Addressed: discharge planning     Patient plan of care discussed at interdisciplinary rounds: Yes    Anticipated Discharge Disposition: Skilled Nursing Facility, Home Care     Anticipated Discharge Services:    Anticipated Discharge DME:      Patient/family educated on Medicare website which has current facility and service quality ratings:    Education Provided on the Discharge Plan: Yes  Patient/Family in Agreement with the Plan:      Referrals Placed by CM/SW: Post Acute Facilities  Private pay costs discussed: Not applicable    Additional Information:  No accepting TCU yet. Additional referrals sent this morning. Barriers to placement include finding a facility that accepts [employer] insurance and pt's participation with nursing and PT/OT. Pt's [facility]. [facility] following.     HOWIE Rivas, [facility]  [phone] Desk phone  [phone] Cell/text (Preferred)  [facility]

## 2023-08-30 NOTE — CONSULTS
"Pain Service Consultation Note  Telemedicine Consult  [facility]       Acute Inpatient Pain Service [facility]  Coverage Monday-Friday 8:00-4:30  No weekend coverage contact house officer  [employer] Paging/y Pain  Securely message with the Vocera Web Console (learn more here)    Patient Name: Melanie Cox  MRN: [id number]   Age: 62 year old  Sex: female  Date: August 30, 2023                                      Reviewed: Yes    Referring Provider:  Zbigniew Arreaga MD  Referring Service: Hospitalist  Reason for Consultation: Pain management    Melanie Cox is a 62 year old female who was admitted on 8/23/2023.   . I was asked to see the patient for pain management. Past medical history of COPD, chronic pain syndrome on chronic opiates, degenerative disc disease of lumbar and cervical spine, hypertension, heart failure with preserved EF, pressure ulcer right hip, gastroesophageal reflux, chronic alcohol abuse with concerns for vulnerable adult status.  Patient reports that her pain is all over and notes almost every place on her body.  She reports that her pain has not increased from baseline.  She also reports she has been on gabapentin in the past which has not been helpful, when offered transitioning to Lyrica she stated she was not interested.  She would not consider any additional medication until \"her's lumbar fractures were fixed\" stated to her that recent film imaging did not support new acute fractures but fusions.  She does endorse her transition from oxycodone to morphine as outpatient.  Per her report has fairly good efficacy with onset and duration of affect of her opiates in the hospital.  She is sleepy today, resistant and disagreeable.  She would not describes or rate her pain.  Then proceeded to move the camera from her review and point to the window asking me to stop the interview.  I offered to see her tomorrow in person and she felt that would not be necessary.  In light of " her alcohol use disorder and that her pain does not appear to be much above her baseline I will reduce her Dilaudid IV use due to sedation.  This writer highly recommends to begin an opioid taper as she is not a safe candidate for opioid use chronically.  It is unclear about her reliability and there is concern for misuse and abuse but if she was in a supervised setting she may get better pain control with the use of a buprenorphine product.  The patient denies any areas of new pain, shortness of breath, chest tightness or distress. The patient does smoke and chemical dependency history with alcohol use. Opioid tolerance is high given alcohol use increase hepatic metabolism of opiates and history of opioid overuse.   Assessment/Recommendations:  62 year old female admitted for fever and found to be septic with pneumonia.  She is a chronic pain history on chronic opiates in setting of musculoskeletal disease prior lumbar fusion, elevated liver enzymes now normalizing, with stated overuse using morphine and oxycodone concurrently.  Her current MELD score is 8. Chronic pain does not appear to be above baseline.  She is not open to other multimodal therapies.  Today sedated but able to answer questions so will reduce access to the amount of IV Dilaudid she is getting presently.    Opioid Induced Respiratory Depression Risk Assessment:  (Low 0-1; Moderate 2-4; or High >4 or >/= 3 if two of the risk factors are age > 60 and opioid naive) due to the following risk factors: KOSTA, COPD/Asthma/pulmonary disease, CHF, renal dysfunction, hepatic dysfunction, Obesity, Smoker, Age>60, >2 opioid therapies, concomitant CNS depressants, opioid naive status, or post surgical ?   Chronic opioid use = unable to determine mg MME, best estimate, on low side is 15 mg morphine equivalent opioid used this past 24 hours 92.5 mg morphine equivalent in the form of 3.5 mg hydromorphone IV and 15 mg of oxycodone..     PLAN:   1)  Pain is  "consistent with widespread pain in multiple sites, secondary to musculoskeletal disease of chronic nature,  in the setting of new onset of fever noting sepsis on admission with pneumonia, chronic alcohol use disorder and elevated LFTs trending downward.  The treatment plan includes multimodal pain approach, medications as listed below, reviewed.  Discharge medications, advised keeping track of doses, educated on tapering off as pain improves, watch for constipation and stool softeners, no driving or alcohol with opioids, store medications in a safe place, advised to take no more than the prescribed dose as increased doses may cause respiratory depression or death. Patient is understanding of the plan, and refused multimodal approach all questions and concerns as best as possible to patient's satisfaction.     2)Multimodal Medication Therapy  Topical: Diclofenac ointment to tender areas as needed  Adjuvants: CrCl is*106.5 and Tylenol 650 mg every 8 hours as needed (would not schedule due to liver disease)\", Hydroxyzine not indicated\" Gabapentin indicated but patient refused\" muscle relaxer's not indicated.  Antidepressants/anxiolytics: None  Opioids:  Morphine immediate release 15 mg every 4 hours as needed for today.  Recommend reduction to every 6 hours as needed same dosage on 8/31/2023 .  In a supervised setting and with her primary care provider on board patient could be considered for buprenorphine to treat her pain.  Dose and type to be determined.  IV Pain medication: Dilaudid 0.3 mg every 6  hours as needed, recommend stopping on 8/31/2023 and Try to minimize & give po first for today.    3)Non-medication interventions- Ice, Heat, physical therapy, distraction aroma therapy     4)Constipation Prophylaxis- senna and miralax   continue to monitor.   5) Follow up   -Opioid prescriber has been primary care. PCP is Sylvester Davidson  -Discharge Recommendations - We recommend prescribing the following at the time " of discharge:   No opiates are recommended for this patient at discharge.  Consider Lyrica 25 mg 3 times daily or 50 mg twice daily  Acetaminophen should stay add as needed no more than 2000 mg/day    Disposition:  To present living environment unless indicated otherwise by therapies  Prescribing at discharge hospitalist      Thank you for the opportunity to participate in the care of Melanie Cox  Pain Service will continue to follow.  If patient continues to refuse follow-up will sign off on 8/31/2023    Primary Service Contacted with Recommendations? Yes    Payton Shultz, APRN CNP  8/30/2023      Chief Complaint:  Widespread generalized chronic pain      History of Present Illness:  Melanie Cox is a 62 year old female admitted with fever found to have sepsis and pneumonia..  The pain is reported to be acute increase in her pain due to medical illness but, chronic pain today seems to be at baseline, located everywhere head to toe, and not radiate.  Current pain is rated at (would not state)/10 and goal is (would not state)/10. The patient is with chronic pain history in setting of noted past medical history below.  She was recently rotated from oxycodone to morphine which she thinks has been helpful.  The patient has a high opioid tolerance, as well as taking morphine and oxycodone concurrently and hypermetabolism of liver due to alcohol abuse. Opioid induced side effects are positive noted for sedation, negative for nausea , and constipation.  There is no history sleep dysfunction, sleep apnea, and not has abnormal liver function now normalizing.  The patient  has positive substance use disorder with alcohol.      Past pain treatments have include  pain clinic no, TENS no, PT yes,. Pharmacological treatments (in past) have included oxycodone, gabapentin. Past surgeries include lumbar fusion.     Per [place]  review pulled from system on 08/30/23. Last refill on morphine IR 15 mg #60 on 8/19/2023,  oxycodone 5 mg #60 08/03/23 indicating the following analgesic usage.     Past Medical History:  Past Medical History:   Diagnosis Date    Chronic pain syndrome     On chronic narcotics.    COPD (chronic obstructive pulmonary disease) (H)     DDD (degenerative disc disease), lumbar     Gastroesophageal reflux disease without esophagitis     Heart failure with preserved ejection fraction, NYHA class I (H)     High risk social situation     H/o EtOH abuse along with her significant other    HTN (hypertension)     Pressure ulcer, hip     Due to lying on floor continuously         Family History:    Family History   Problem Relation Age of Onset    Heart Disease Father        Social History:  Social History     Tobacco Use    Smoking status: Every Day     Types: Cigarettes    Smokeless tobacco: Not on file   Substance Use Topics    Alcohol use: Not on file         Tobacco:   Yes  ETOH: Yes chronic misuse abuse  H/O Substance Abuse: No other substances      Review of Systems:  Complete ROS reviewed. Unless otherwise noted, all other systems found to be negative.        Laboratory Results:  Recent Labs   Lab Test 08/29/23  0542 08/26/23  1010 08/25/23  1212   INR  --   --  1.18*      < >  --    BUN 6.7*   < >  --     < > = values in this interval not displayed.         Allergies:  Allergies   Allergen Reactions    Brilliant Green     Diatrizoate Meglumine [Diatrizoate]      rash    Gentian Violet     Ibuprofen     Nsaids      Bleeding    Proflavine          Pain Medications:  Pain Medications/Prescriber: Primary care    Current Pain Related Medications:  Medications related to Pain Management (From now, onward)      Start     Dose/Rate Route Frequency Ordered Stop    08/30/23 1455  HYDROmorphone (PF) (DILAUDID) injection 0.3 mg         0.3 mg Intravenous EVERY 3 HOURS PRN 08/30/23 1455      08/25/23 1130  acetaminophen (TYLENOL) tablet 650 mg        See Hyperspace for full Linked Orders Report.    650 mg Oral EVERY  "8 HOURS PRN 08/25/23 1100      08/24/23 1813  lidocaine 1 % 0.1-1 mL         0.1-1 mL Other EVERY 1 HOUR PRN 08/24/23 1813      08/24/23 1813  lidocaine (LMX4) cream          Topical EVERY 1 HOUR PRN 08/24/23 1813      08/23/23 1600  Lidocaine (LIDOCARE) 4 % Patch 1 patch        Note to Pharmacy: PTA Sig:Place onto the skin every 24 hours To prevent lidocaine toxicity, patient should be patch free for 12 hrs daily.      1 patch  over 12 Hours Transdermal EVERY 24 HOURS 08/23/23 1530      08/23/23 1505  morphine (MSIR) IR tablet 15 mg        Note to Pharmacy: PTA Sig:Take 15 mg by mouth every 4 hours as needed for pain      15 mg Oral EVERY 4 HOURS PRN 08/23/23 1505                Physical Exam:  Vitals: /50 (BP Location: Right arm)   Pulse 74   Temp 98.5  F (36.9  C) (Oral)   Resp 20   Ht 1.575 m (5' 2\")   Wt 63.7 kg (140 lb 6.9 oz)   SpO2 90%   BMI 25.69 kg/m      Physical Exam:     CONSTITUTIONAL/GENERAL APPEARANCE:  NAD, mildly sedated, disgruntled  RESPIRATORY: No apparent distress with ease even chest rise.  No audible wheezing   NEURO: Alert and Oriented x3. Answers questions appropriately  PSYCHE: affect disgruntled, oriented,appropriate, pain behaviors Yes    Tele-Visit Details    Type of service:  Video Visit    Video Start Time (time video started): 2:56 PM    Video End Time (time video stopped): 3:25 PM    Originating Location (pt. Location): Patient Hospital Room     Distant Location (provider location): [facility]    Reason for Televisit: Pain Consult     Mode of Communication:  Video Conference via [email or web address]    Physician has received verbal consent for a video visit from the patient? Yes      MERRITT Toscano CNP     Please see A&P for additional details of medical decision making.  MANAGEMENT DISCUSSED with the following over the past 24 hours: Zbigniew Arreaga MD, Sandra Hsu RN   NOTE(S)/MEDICAL RECORDS REVIEWED over the past 24 hours: Yes  Tests REVIEWED in " the past 24 hours:  - See lab/imaging results included in the data section of the note  - CMP  - CBC  - INR, CT pelvis abdomen  Medical complexity over the past 24 hours:  - Decision to DE-ESCALATE CARE based on prognosis  - Prescription DRUG MANAGEMENT performed  59 MINUTES SPENT BY ME on the date of service doing chart review, history, exam, documentation & further activities per the note.    Acute Inpatient Pain Service [facility]  Coverage Monday-Friday 8:00-4:30  No weekend coverage contact house officer  AMCOM Paging/Directory Pain  Securely message with the Vocera Web Console (learn more here)

## 2023-08-30 NOTE — PROGRESS NOTES
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  Small opening with undermining/ tunneling of 2-3cm.  No acute s/s infection.  Pt follows with her surgical team as outpt.  Will continue Vashe-moist Mesalt packing.      Patient History (according to provider note(s):      Melanie Cox is a 62 year old lady with substance abuse, chronic back pain s/p fusion on narcotics, h/o opiate overdose, GERD, COPD, H/o Rt. LL pna, H/o HFpEF and chronic Rt hip wound (h/o pressure ulcers). She presented to ED in [facility] with Fevers/Altered mental status. She has septic shock due to left LL pna. My assessment and plan for this patient is as follows:     Areas Assessed:      Areas visualized during today's visit: Sacrum/coccyx and right hip    Wound location: Right hip    Last photo: 8-30-23 8-23-23      Wound due to: Pressure Injury per report  Wound history/plan of care: unclear, pt states will be 2 years this December, has a wound care friend who packs a 'shoelace' dressing into wound 3x week; per chart review has been followed in [facility] in [facility]  Wound base: what is visible is light pink moist tissue     Palpation of the wound bed: normal and firm      Drainage: small     Description of drainage: serosanguinous     Measurements (length x width x depth, in cm): opening approx 1.2 x 0.4 x 0.5cm     Tunneling vs undermining from approx 1-6 o'clock, up to 3cm   Periwound skin: Rashy erythema/ MARSI has improved; pink scar tissue      Color: red      Temperature: normal   Odor: none  Pain: mild, tender    Pain interventions prior to dressing change: slow and gentle cares   Treatment goal: Drainage control, Infection control/prevention, Maintain (prevention of deterioration), and Protection  STATUS: stable  Supplies ordered: ordered more Mesalt from  8/30 8-23-23 coccyx, buttocks -  "intact, with resurfacing superficial friction/abrasion vs old PI left buttock      8-30-23 - healed      Treatment Plan:     Right hip wound(s): Daily and prn:  Cleanse wound and surrounding skin with Vashe-moist gauze, then let dry.  Swab out wound with qtip, identifying the depth and direction of the undermining (where you will need to pack).   Swab periwound with no-sting skin barrier film, let dry.  Pack wound with Vashe-moistened Mesalt ribbon (# [id number]), using wooden end of qtip.  Leave a small tail sticking out for easy removal.   Cover with Mepilex 4x4 or gauze and Medipore tape.  PIP measures.     Pressure Injury Prevention (PIP) Plan:  -If patient is declining pressure injury prevention interventions: Explore reason why and address patient's concerns, Educate on pressure injury risk and prevention intervention(s), If patient is still declining, document \"informed refusal\" , and Ensure Care team is aware ( provider, charge nurse, etc)  -Mattress: Follow bed algorithm, reassess daily and order specialty mattress, if indicated.  -HOB: Maintain at or below 30 degrees, unless contraindicated  -Repositioning in bed: Every 1-2 hours , Left/right positioning; avoid supine, and Raise foot of bed prior to raising head of bed, to reduce patient sliding down (shear)  -Heels: Keep elevated off mattress and Pillows under calves  -Protective Dressing: Sacral Mepilex for prevention (#[id number]),  especially for the agitated patient   -Positioning Equipment: TAPS wedges (#067533) to help maintain 30 degree side lying position   -Chair positioning: Assist patient to reposition hourly   -Moisture Management: Perineal cleansing /protection: Follow Incontinence Protocol, Avoid brief in bed, and Clean and dry skin folds with bathing   -Under Devices: Inspect skin under all medical devices during skin inspection , Ensure tubes are stabilized without tension, and Ensure patient is not lying on medical devices or equipment when " repositioned        Orders: Reviewed    RECOMMEND PRIMARY TEAM ORDER: None, at this time  Education provided: plan of care  Discussed plan of care with: Patient and Nurse  WO nurse follow-up plan: weekly and prn  Notify WOC if wound(s) deteriorate.  Nursing to notify the Provider(s) and re-consult the WO Nurse if new skin concern.    DATA:     Current support surface: Standard  Low air loss (MARIO ALBERTO pump, Isolibrium, Pulsate, skin guard, etc) Isolibrium in ICU  Containment of urine/stool: Incontinence Protocol and Purewick external catheter   BMI: Body mass index is 25.69 kg/m .   Active diet order: Orders Placed This Encounter      Regular Diet Adult     Output: I/O last 3 completed shifts:  In: 100 [P.O.:100]  Out: -      Labs:   Recent Labs   Lab 08/29/23  0542 08/28/23  0525 08/26/23  1010 08/25/23  1212   ALBUMIN  --  2.4*   < >  --    HGB 8.5* 9.1*   < >  --    INR  --   --   --  1.18*   WBC 5.6 5.3   < >  --     < > = values in this interval not displayed.       Pressure injury risk assessment:   Sensory Perception: 3-->slightly limited  Moisture: 3-->occasionally moist  Activity: 2-->chairfast  Mobility: 2-->very limited  Nutrition: 3-->adequate  Friction and Shear: 2-->potential problem  Dheeraj Score: 15    Wilda Winslow RN CWOCN  -Securely message with [facility])   -[facility] Office Phone: [phone] (messages checked periodically Mon-Fri 8a-4p)

## 2023-08-30 NOTE — PROVIDER NOTIFICATION
Paged provider     Pt has increasing O2 demand 86-88% on 4L NC. Chest tightness, coarse crackles, and congested cough. Asked provider to order a mucolytic.  She has only robitussin which is a antitussive.

## 2023-08-30 NOTE — PROGRESS NOTES
CLINICAL NUTRITION SERVICES  -  ASSESSMENT NOTE    Recommendations Ordered by Registered Dietitian (RD):   Ordered berry Magic cup at 2pm and dinner  Ordered MVI/M   Malnutrition:   % Weight Loss:  None noted  % Intake:  Decreased intake does not meet criteria for malnutrition  Subcutaneous Fat Loss:  Orbital region severe depletion (not using as criteria as only 1 indicator)  Muscle Loss:  Temporal region severe depletion and Clavicle bone region Mild-Moderate depletion  Fluid Retention:  None noted    Malnutrition Diagnosis: Patient does not meet two of the established criteria necessary for diagnosing malnutrition     REASON FOR ASSESSMENT  Melanie Cox is a 62 year old female seen by Registered Dietitian for [facility].    PMH of COPD, HTN, CHF, chronic spine/disc disease with chronic pain syndrome on opioids, polysubstance abuse, chronic pressure ulcer, and GERD. Presented with fevers, LLL pneumonia with septic shock and acute hypoxic respiratory failure.    NUTRITION HISTORY    - chronic stage IV pressure injury of right hip  - abdominal discomfort, chronic pain in shoulders and back noted per chart review  - Spoke with patient at bedside. She said her biggest barrier to eating is abdominal pain. She said eating is painful and makes it worse. She said her  makes her drink Ensure at home and she doesn't want it ordered at this time. She shook her head for all supplements writer requested and said she only wanted sherbet. Ultimately accepted a magic cup after explanation of it having more calories and protein to help meet nutritional needs and for wound healing.    CURRENT NUTRITION ORDERS  Diet Order: Regular     Current Intake/Tolerance:  - 0-50% intakes documented per flowsheets  - Patient with abdominal pain and diarrhea 8/28  - Per chart review ongoing poor appetite  - Patient ordering 2-3 meals/day    NUTRITION FOCUSED PHYSICAL ASSESSMENT FOR DIAGNOSING MALNUTRITION)  Yes - visual           Observed:   "  Muscle wasting (refer to documentation in Malnutrition section) and Subcutaneous fat loss (refer to documentation in Malnutrition section)    Obtained from Chart/Interdisciplinary Team:  - chronic stage IV pressure injury of right hip    ANTHROPOMETRICS  Height: 5' 2\"  Weight: 63 kg  BMI: 25.2  Weight Status:  Overweight BMI 25-29.9  IBW: 50 kg  % IBW: 126%  Weight History: no notable wt loss recently  Wt Readings from Last 10 Encounters:   08/29/23 63.7 kg (140 lb 6.9 oz)     08/29/23 0418 63.7 kg (140 lb 6.9 oz) Bed scale   08/28/23 0600 65.3 kg (143 lb 15.4 oz) Bed scale   08/27/23 0459 64.7 kg (142 lb 10.2 oz) --   08/24/23 0400 67.5 kg (148 lb 13 oz) Bed scale   08/23/23 0400 63 kg (138 lb 14.2 oz) Bed scale   08/23/23 0030 62.7 kg (138 lb 3.7 oz)    Per Care Everywhere:  59.9 kg (132 lb) 07/31/2023  59.9 kg (132 lb) 07/31/2023  61.2 kg (135 lb) 05/10/2023  63 kg (138 lb 14.4 oz) 02/27/2023    LABS  Mg 1.6 (L)    MEDICATIONS  Folic acid, MVI/M, thiamine    ASSESSED NUTRITION NEEDS PER APPROVED PRACTICE GUIDELINES:  Dosing Weight 53.4 kg (adjusted)  Estimated Energy Needs: 9275-7127 kcals (25-30 Kcal/Kg)  Justification: maintenance  Estimated Protein Needs: 80+ grams protein (1.5+ g pro/Kg)  Justification: wound healing  Estimated Fluid Needs: 1 mL/kcal or per provider pending fluid status    NUTRITION DIAGNOSIS:  Increased needs related to wound healing as evidenced by recommended estimated protein intake of at least 1.5 g pro/kg per day    NUTRITION INTERVENTIONS  Recommendations / Nutrition Prescription  Magic Cup BID ordered    Implementation  Nutrition education: Per Provider order if indicated   Medical Food Supplement - ordered  Multivitamin/Mineral - ordered    Nutrition Goals  Total avg protein intake to meet a minimum of 80 g PRO/kg daily (per dosing wt 53.4 kg).    MONITORING AND EVALUATION:  Progress towards goals will be monitored and evaluated per protocol and Practice Guidelines    Anita Pineda, " CAMERON, FRANK  Clinical Dietitian - [facility]

## 2023-08-30 NOTE — PLAN OF CARE
Goal Outcome Evaluation:       Orientations: A&Ox4  Vitals/Pain: VSS on 3L NC. Pt complains of pain 9/10 relieved with PRN IV Dilaudid, heating pad, and lidocaine patch.   LS: Frequent cough, LS diminished.   Lines/Drains: PICC line CDI  Skin/Wounds: Pressure injury on R hip CDI,   GI/: Voiding, Incontinent of urine.   Labs: Abnormal/Trends, Electrolyte Replacement- Trop 13, Mg 1.6, K 3.7, Phos 3.6   Ambulation/Assist: A1 GBW   Sleep Quality: Poor  Plan: Stool sample for Cdiff rule out pending. Paged provider for mucolytic d/t chest tightness, congestion, and coarse crackles.

## 2023-08-30 NOTE — PROGRESS NOTES
Pt refused to use walker & gait belt for safety. Doesn't use call light before getting ou of bed. Health teachings done, pt uncooperative.

## 2023-08-30 NOTE — PROGRESS NOTES
[facility]    Medicine Progress Note - Hospitalist Service    Date of Admission:  8/23/2023    Assessment & Plan   Melanie Cox is a 62 year old female with a past medical history significant for COPD; HTN; CHF (HFpEF); chronic spine/disc disease with chronic pain syndrome on opioids; polysubstance abuse; chronic pressure ulcer; and GERD; who presented to OSH 8/22/2023 with fevers with AMS and found to be in septic shock with signs of LLL pneumonia.  Transferred to [facility] ICU 8/22/2023 for management.  Patient was treated with antibiotics and pressors and subsequently improved.  Patient was transferred to the IM service 8/23/2023.    LLL pneumonia with septic shock and acute hypoxic respiratory failure  COPD with ongoing tobacco use  Possible UTI  Initial presentation to OSH as above. Also had UA with 11-25 WBC, small blood, small LE though mod epithelial cells, blood.  Transferred to [facility] ICU 8/22/23.  Started on vanco, pipericillin-tazobactam and doxycycline on admit. MRSA nasal PCR negative. BC's  NGTD.   On 8/23, weaned off pressors and transferred to IM team. Vanco stopped. Later in the afternoon, became hypotensive again requiring more fluids and pressors, weaned off in the evening.  Gradually improved and transferred out of ICU.  Vancomycin discontinued on 8/23/23.  Completed 5 day course of doxycycline on 8/27/23.  Completed a 7 day course of Zosyn on 8/29/23.  Currently on 2 lpm of supplemental oxygen, wean as tolerated.  Continue Incruse Ellipta (formulary substitute for spiriva) and PRN DuoNebs.  Blood cultures negative to date.  Sputum culture with normal adam.    Acute septic, metabolic and toxic encephalopathy, improved  Presented with confusion and decreased LOC. EtOH level in ED 0.048. CT head negative for acute findings.  Mental status improved. She has been conversant, A&O. Drowsy following opioids. Neuro intact.  Re-orient as needed.  Maintain normal day/night,  sleep/wake cycles.  Minimize sedating medications as able.    Diarrhea, resolved  Abdominal pain  Possible pancreas abnormality  Multiple loose stools on morning of 8/28/23. Also complains of abdominal pain. No fevers. WBC with in normal limits. Has been on Zosyn for the past 7 days.  No further BM since AM of 8/28/23. Stool for c.diff was ordered but has now been cancelled.  CT abdomen/pelvis on 8/29/23 showed questionable fullness in the region of the pancreatic neck.  Lipase within normal limits.  Will ask GI to see her, appreciate their assistance.    Elevated LFTs, unclear etiology, question hypoperfusion from sepsis, alcohol use  * Patient is s/p cholecystectomy.  Continue to treat other issues as noted.  LFTs improved, monitor intermittently.    Hypertension (benign essential)  Chronic CHF (HFpEF)  [PTA: metoprolol 50 mg BID.]  * ECHO 3/2022 showed LV EF 73% with normal RV function.   * BNP 8/22/23 in ED elevated at 10,700, not clinically volume up. Issues with septic shock as noted. Metoprolol held on admit.  BP's more elevated 8/25, PTA metoprolol restarted.  Blood pressure better controlled, continue to monitor.  PRN IV hydralazine available for high blood pressure.  Monitor I&O and daily weights.    Anemia, suspect dilutional and/or chronic  * Baseline hemoglobin around 14-15 earlier this year. Hgb 10.2 on admit 8/23. No overt clinical signs of major bleeding.  Hemoglobin 8.5 on 8/29/23, recheck in AM.    Coagulopathy, suspect due to decreased PO intake  * INR 1.45 on admit 8/23.  INR improved to 1.18 on 8/25/23.    Alcohol use  On 8/23, patient reported periodic drinking at home, denied daily use or history of withdrawal. Etoh level on admission 0.048 as above. No overt withdrawal noted.  Continue to monitor clinically.    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 Last seen  7/31/2023.  * On admit, wound did not appears acutely infected.  WOC consulted 8/23.  Continue local wound cares per WOC RN.    Chronic pain syndrome  Per chart review, there was some concern for misuse. Recently switched from oxycodone to morphine IR at appt 8/9. PTA: morphine 15mg q4 hours PRN, however also was taking prior oxycodone until it runs out. PTA also on lidocaine patch. Gabapentin on med list but patient not taking.  Continue lidocaine 4% patch q24h; PRN morphine 15 mg q4h; PRN IV hydromorphone; minimize opioids as able.  Pain team consulted, appreciate their assistance.  Plan to taper off IV pain medication as recommended by pain team.    GERD  Continue PTA PPI and sucralfate.    Vulnerable adult  * Per report, arrived to ED covered in stool. Chart review indicated prior admission after laying on the floor for several days, concern for unsafe living situation and history of multiple vulnerable adult reports filed.   SW following.  TCU recommended, but patient currently reluctant.    Weakness and physical deconditioning due to multiple acute and chronic medical issues  PT and OT consulted, recommending TCU.  Encouraged participation with therapies in the hospital and consideration of TCU at the time of discharge.    Hypokalemia  Hypomagnesemia  Hypophosphatemia  Replace and recheck per RN managed protocol.    Hypocalcemia  * Given IV calcium 8/23.  Resolved with replacement.    Lines, etc.  * Midline placed 8/24.  Continue midline.       Diet: Regular Diet Adult  Room Service  Snacks/Supplements Adult: Magic Cup; Between Meals    DVT Prophylaxis: Enoxaparin (Lovenox) SQ  Leal Catheter: Not present  Lines: PRESENT             Cardiac Monitoring: None  Code Status: Full Code      Clinically Significant Risk Factors            # Hypomagnesemia: Lowest Mg = 1.6 mg/dL in last 2 days, will replace as needed   # Hypoalbuminemia: Lowest albumin = 2.4 g/dL at 8/28/2023  5:25 AM, will monitor as appropriate       "      # Overweight: Estimated body mass index is 25.69 kg/m  as calculated from the following:    Height as of this encounter: 1.575 m (5' 2\").    Weight as of this encounter: 63.7 kg (140 lb 6.9 oz).             Disposition Plan      Expected Discharge Date: 08/31/2023,  3:00 PM      Discharge Comments: 8/30- Psych consult, pain consult.          Zbigniew Arreaga MD  Hospitalist Service  [facility]  Securely message with [email or web address] (more info)  Text page via [employer] Paging/Directory   ______________________________________________________________________    Interval History   Melanie Cox was seen today. Continues to complain of abdominal pain. No nausea/vomiting. No significant BM in the past 48 hours. Intermittent fevers/sweats. Denies chest pain, shortness of breath, urinary symptoms. Hasn't been out of bed much beyond getting up to the commode.    Physical Exam   Vital Signs: Temp: 98.5  F (36.9  C) Temp src: Oral BP: 110/50 Pulse: 74   Resp: 16 SpO2: 90 % O2 Device: Nasal cannula Oxygen Delivery: 2 LPM  Weight: 140 lbs 6.93 oz    Constitutional: awake, alert, cooperative, no apparent distress, laying in the hospital bed  Respiratory: no increased work of breathing, diminished at the bases  Cardiovascular: regular rate and rhythm, normal S1 and S2, no murmur noted  GI: normal bowel sounds, soft, non-distended, upper abdominal tenderness  Skin: warm, dry  Musculoskeletal: no lower extremity pitting edema present  Neurologic: awake, alert, answers questions appropriately, moves all extremities    Medical Decision Making       40 MINUTES SPENT BY ME on the date of service doing chart review, history, exam, documentation & further activities per the note.      Data     I have personally reviewed the following data over the past 24 hrs:    N/A  \   N/A   / N/A     N/A N/A N/A /  N/A   4.0 N/A N/A \     Trop: 13 BNP: N/A       Imaging results reviewed over the past 24 hrs:   Recent Results (from " the past 24 hour(s))   CT Abdomen Pelvis w/o Contrast    Narrative    EXAM: CT ABDOMEN PELVIS W/O CONTRAST  LOCATION: [facility]  DATE: 8/29/2023    INDICATION: severe upper abdominal pain, poor appetite, WBC normal, intermittent mild fevers, two loose stools yesterday   none since then  COMPARISON: None.  TECHNIQUE: CT scan of the abdomen and pelvis was performed without IV contrast. Multiplanar reformats were obtained. Dose reduction techniques were used.  CONTRAST: None.    FINDINGS:   LOWER CHEST: Airspace consolidation in the left lower lobe with patchy areas of groundglass opacities anteriorly in the lung bases and atelectasis versus infiltrate in the right lower lobe along the diaphragm. Small left pleural effusion. Moderate -   sized hiatal hernia. Evidence of prior granulomatous disease.    HEPATOBILIARY: Absent gallbladder. A few calcified hepatic granulomas.    PANCREAS: Question of some fullness in the region of the pancreatic neck as seen on axial image 79. No peripancreatic inflammatory change.    SPLEEN: Scattered calcified granulomas.    ADRENAL GLANDS: Normal.    KIDNEYS/BLADDER: Normal.    BOWEL: No mechanical bowel obstruction or free air. Appendix not visualized with certainty.    LYMPH NODES: Normal.    VASCULATURE: Aortoiliac atherosclerotic calcification without aneurysm.    PELVIC ORGANS: Uterus is atrophic or absent. No free fluid.    MUSCULOSKELETAL: Severe osteopenia. Lumbosacral fusion from L2-S1. There is some lucency along the screw tracts in the S1 vertebral body as seen on coronal image 73. A spinal catheter is in place. Severe compression fracture deformity at T12 with   moderate compression deformities at T8 and T10. There is skin thickening along a suspected ulceration at the lateral aspect of the right hip superficial to the greater trochanter as seen on image 180 of series 3 and image 57 of series 9.      Impression    IMPRESSION:   1.  Left lower lobe  pneumonia. Additional opacities in the lung bases may reflect further pneumonia.    2.  Suspected soft tissue ulceration along the lateral aspect of the right hip. Recommend correlation with physical exam. No underlying osseous destruction.    3.  Severe osteopenia with extensive spinal fusion changes. Small amount of osteolysis along the fixation screws in the S1 vertebral body may reflect a component of loosening.    4.  Questionable fullness in the region of the pancreatic neck, difficult to fully evaluate on this noncontrast study particularly given the beam hardening artifact from the spinal hardware. A follow-up examination with IV contrast would be of benefit   when clinically feasible.

## 2023-08-31 NOTE — PLAN OF CARE
"[id number]    Pt withdrawn and agitated all day, expresses disbelief that providers changed pain med regimen and calling writer \"liar\". PT ambulated pt in rush but reported she refused oxygen when up and sats dropped to low 80's, however, pt reports that is not true. She also refused gait belt with PT and with RN. Educated pt on fall risk, fall prevention but pt still refused.  Refused full assessment. VSS on 3L O2  LS diminished with crackles at bases  BS+. Voiding  R hip PI dressing with scant drainage, dressing due to change today.  Continues to rate pain high 9-10/10, \"everywhere\".  Gave last dose of IV dilaudid around 1150 and explained to pt plan to stop IV dilaudid- Dr. Arreaga also informing pt of this. Gave tylenol and PRN morphine prior to transfer to station 66, room 621.  "

## 2023-08-31 NOTE — PROGRESS NOTES
[facility]    Medicine Progress Note - Hospitalist Service    Date of Admission:  8/23/2023    Assessment & Plan   Melanie Cox is a 62 year old female with a past medical history significant for COPD; HTN; CHF (HFpEF); chronic spine/disc disease with chronic pain syndrome on opioids; polysubstance abuse; chronic pressure ulcer; and GERD; who presented to OSH 8/22/2023 with fevers with AMS and found to be in septic shock with signs of LLL pneumonia.  Transferred to [facility] ICU 8/22/2023 for management.  Patient was treated with antibiotics and pressors and subsequently improved.  Patient was transferred to the IM service 8/23/2023.    LLL pneumonia with septic shock and acute hypoxic respiratory failure  COPD with ongoing tobacco use  Possible UTI  Initial presentation to OSH as above. Also had UA with 11-25 WBC, small blood, small LE though mod epithelial cells, blood.  Transferred to [facility] ICU 8/22/23.  Started on vanco, pipericillin-tazobactam and doxycycline on admit. MRSA nasal PCR negative. BC's  NGTD.   On 8/23, weaned off pressors and transferred to IM team. Vanco stopped. Later in the afternoon, became hypotensive again requiring more fluids and pressors, weaned off in the evening.  Gradually improved and transferred out of ICU.  Vancomycin discontinued on 8/23/23.  Completed 5 day course of doxycycline on 8/27/23.  Completed a 7 day course of Zosyn on 8/29/23.  Currently on 2 lpm of supplemental oxygen, wean as tolerated. She was not on home oxygen prior to admission.  Continue Incruse Ellipta (formulary substitute for spiriva) and PRN DuoNebs.  Blood cultures negative to date.  Sputum culture with normal adam.    Acute septic, metabolic and toxic encephalopathy, improved  Presented with confusion and decreased LOC. EtOH level in ED 0.048. CT head negative for acute findings.  Mental status improved. She has been conversant, A&O. Drowsy following opioids. Neuro  intact.  Re-orient as needed.  Maintain normal day/night, sleep/wake cycles.  Minimize sedating medications as able.    Diarrhea, resolved  Abdominal pain  Possible pancreas abnormality  Multiple loose stools on morning of 8/28/23. Also complains of abdominal pain. No fevers. WBC with in normal limits. Has been on Zosyn for the past 7 days.  No further BM since AM of 8/28/23. Stool for c.diff was ordered but has now been cancelled.  CT abdomen/pelvis on 8/29/23 showed questionable fullness in the region of the pancreatic neck.  Lipase within normal limits.  GI consulted, appreciate their assistance.  Consider EGD/EUS when respiratory status improved.    Elevated LFTs, unclear etiology, question hypoperfusion from sepsis, alcohol use  * Patient is s/p cholecystectomy.  Continue to treat other issues as noted.  LFTs improved, monitor intermittently.    Hypertension (benign essential)  Chronic CHF (HFpEF)  [PTA: metoprolol 50 mg BID.]  * ECHO 3/2022 showed LV EF 73% with normal RV function.   * BNP 8/22/23 in ED elevated at 10,700, not clinically volume up. Issues with septic shock as noted. Metoprolol held on admit.  BP's more elevated 8/25, PTA metoprolol restarted.  Blood pressure better controlled, continue to monitor.  PRN IV hydralazine available for high blood pressure.  Monitor I&O and daily weights.    Anemia, suspect dilutional and/or chronic  * Baseline hemoglobin around 14-15 earlier this year. Hgb 10.2 on admit 8/23. No overt clinical signs of major bleeding.  Hemoglobin stable/improved at 10.6 on 8/31/23, recheck in AM.    Coagulopathy, suspect due to decreased PO intake  * INR 1.45 on admit 8/23.  INR improved to 1.18 on 8/25/23.    Alcohol use  On 8/23, patient reported periodic drinking at home, denied daily use or history of withdrawal. Etoh level on admission 0.048 as above. No overt withdrawal noted.  Continue to monitor clinically.    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 Last seen 7/31/2023.  * On admit, wound did not appears acutely infected.  WOC consulted 8/23.  Continue local wound cares per WOC RN.    Chronic pain syndrome  Per chart review, there was some concern for misuse. Recently switched from oxycodone to morphine IR at appt 8/9. PTA: morphine 15mg q4 hours PRN, however also was taking prior oxycodone until it runs out. PTA also on lidocaine patch. Gabapentin on med list but patient not taking.  Continue lidocaine 4% patch q24h.  Minimize opioids as able.  Discontinue IV dilaudid.  Continue PRN morphine for now.  Pain team consulted, appreciate their assistance. Discussed with pain team today.    GERD  Continue PTA PPI and sucralfate.    Vulnerable adult  * Per report, arrived to ED covered in stool. Chart review indicated prior admission after laying on the floor for several days, concern for unsafe living situation and history of multiple vulnerable adult reports filed.   SW following.  TCU recommended, but patient currently reluctant.    Weakness and physical deconditioning due to multiple acute and chronic medical issues  PT and OT consulted, recommending TCU.  Encouraged participation with therapies in the hospital and consideration of TCU at the time of discharge.    Hypokalemia  Hypomagnesemia  Hypophosphatemia  Replace and recheck per RN managed protocol.    Hypocalcemia  * Given IV calcium 8/23.  Resolved with replacement.    Lines, etc.  * Midline placed 8/24.  Continue midline.       Diet: Regular Diet Adult  Room Service  Snacks/Supplements Adult: Magic Cup; Between Meals    DVT Prophylaxis: Enoxaparin (Lovenox) SQ  Leal Catheter: Not present  Lines: PRESENT             Cardiac Monitoring: None  Code Status: Full Code      Clinically Significant Risk Factors            # Hypomagnesemia: Lowest Mg = 1.6 mg/dL in last 2 days, will replace as needed   #  "Hypoalbuminemia: Lowest albumin = 2.4 g/dL at 8/28/2023  5:25 AM, will monitor as appropriate            # Overweight: Estimated body mass index is 25.69 kg/m  as calculated from the following:    Height as of this encounter: 1.575 m (5' 2\").    Weight as of this encounter: 63.7 kg (140 lb 6.9 oz).             Disposition Plan      Expected Discharge Date: 09/02/2023,  3:00 PM      Discharge Comments: 8/30- Psych consult, pain consult.          Zbigniew Arreaga MD  Hospitalist Service  [facility]  Securely message with [employer] (more info)  Text page via [employer] Paging/Directory   ______________________________________________________________________    Interval History   Melanie Cox was seen today. She doesn't feel good. Wants pain medications more frequently. Discussed concerns about frequent IV dilaudid use and not trying to control pain with oral medications. Discussed recommendations from the pain service. She states she is doing OK and just wants to go home with her fiancee. I asked if I could call her vandanae to discuss potential discharge plans and she told me not to contact him.    Physical Exam   Vital Signs: Temp: 98.3  F (36.8  C) Temp src: Oral BP: 96/64 Pulse: 115   Resp: 16 SpO2: 98 % O2 Device: Nasal cannula Oxygen Delivery: 2 LPM  Weight: 140 lbs 6.93 oz    Constitutional: awake, alert, cooperative, laying in the hospital bed, does not appear to be in distress, became more agitated as the visit went on  Respiratory: no increased work of breathing, diminished at the bases  Cardiovascular: regular rhythm, mildly tachycardic, normal S1 and S2, no murmur noted  GI: normal bowel sounds, soft, non-distended, upper abdominal tenderness  Skin: warm, dry  Musculoskeletal: no lower extremity pitting edema present  Neurologic: awake, alert, answers questions appropriately, moves all extremities    Medical Decision Making       45 MINUTES SPENT BY ME on the date of service doing chart " review, history, exam, documentation & further activities per the note.      Data     I have personally reviewed the following data over the past 24 hrs:    7.7  \   10.6 (L)   / 495 (H)     134 (L) 97 (L) 8.7 /  104 (H)   3.9 24 0.51 \

## 2023-08-31 NOTE — PROVIDER NOTIFICATION
"Patient HR running between 120s-130s. Patient asymptomatic. Schedule metoprolol not due yet. Paged Dr Maza requesting an order for PRN metoprolol. Per Dr Maza text, \"If patient asymptomatic will not be aggressive with HR control. Heart rate of 120s-130s is acceptable heart rate\".  "

## 2023-08-31 NOTE — CONSULTS
[facility]  Gastroenterology Consultation         Melanie Cox  [id number] [facility] [id number]  62 year old female    Admission Date/Time: 8/23/2023  Primary Care Provider: Sylvester Davidson  Referring / Attending Physician:  Dr. Zbigniew Arreaga    We were asked to see the patient in consultation by Dr. Zbigniew Arreaga for evaluation of abdominal pain.      CC: abdominal pain    HPI:  Melanie Cox is a 62 year old female who was admitted on 8/22/2023 to ICU with septic shock with signs of LLL pneumonia. Has past past medical history of HTN; CHF (HFpEF); chronic spine/disc disease with chronic pain syndrome on opioids; polysubstance abuse; chronic pressure ulcer; and GERD; who presented to OSH 8/22/2023 with fevers with AMS. [place] [facility] [place] had [place] improvement in respiratory status but remains on supplement oxygen.    She has chronic pain and has been c/o severe abdominal pain in epigastric area. States coughing and sneezing makes worse and resting improves pain. Present for one week. Requests more pain medications. Denies nausea and vomiting. Has had small amount of diarrhea but none over last 2 days. She has had no chest pain, shortness of breath or urinary symptoms. When in room O2 stats dropped to 70-80%.    Of note on admission covered in stool, prior to admission after laying on the floor for several days,     ROS: A comprehensive ten point review of systems was negative aside from those in mentioned in the HPI.      PAST MED HX:  I have reviewed this patient's medical history and updated it with pertinent information if needed.   Past Medical History:   Diagnosis Date    Chronic pain syndrome     On chronic narcotics.    COPD (chronic obstructive pulmonary disease) (H)     DDD (degenerative disc disease), lumbar     Gastroesophageal reflux disease without esophagitis     Heart failure with preserved ejection fraction, NYHA class I (H)     High  risk social situation     H/o EtOH abuse along with her significant other    HTN (hypertension)     Pressure ulcer, hip     Due to lying on floor continuously       MEDICATIONS:   Prior to Admission Medications   Prescriptions Last Dose Informant Patient Reported? Taking?   albuterol (PROAIR HFA/PROVENTIL HFA/VENTOLIN HFA) 108 (90 Base) MCG/ACT inhaler  at PRN  Yes Yes   Sig: Inhale 1-2 puffs into the lungs every 4 hours (while awake)   gabapentin (NEURONTIN) 300 MG capsule   Yes Yes   Sig: Take 300 mg by mouth 3 times daily   hydrOXYzine (VISTARIL) 25 MG capsule   Yes Yes   Sig: Take 50 mg by mouth At Bedtime   lidocaine (LIDODERM) 5 % patch   Yes Yes   Sig: Place onto the skin every 24 hours To prevent lidocaine toxicity, patient should be patch free for 12 hrs daily.   metoprolol tartrate (LOPRESSOR) 50 MG tablet   Yes Yes   Sig: Take 50 mg by mouth 2 times daily   morphine (MSIR) 15 MG IR tablet   Yes Yes   Sig: Take 15 mg by mouth every 4 hours as needed for pain   omeprazole (PRILOSEC) 20 MG DR capsule   Yes Yes   Sig: Take 20 mg by mouth daily   oxyCODONE IR (ROXICODONE) 10 MG tablet  at Now Morphine  Yes No   Sig: Take 10 mg by mouth every 6 hours as needed for pain   sucralfate (CARAFATE) 1 GM tablet   Yes Yes   Sig: Take 1 g by mouth 4 times daily   tiotropium (SPIRIVA) 18 MCG inhaled capsule   Yes Yes   Sig: Inhale 18 mcg into the lungs daily      Facility-Administered Medications: None       ALLERGIES:   Allergies   Allergen Reactions    Brilliant Green     Diatrizoate Meglumine [Diatrizoate]      rash    Gentian Violet     Ibuprofen     Nsaids      Bleeding    Proflavine        SOCIAL HISTORY:  Social History     Tobacco Use    Smoking status: Every Day     Types: Cigarettes       FAMILY HISTORY:  Family History   Problem Relation Age of Onset    Heart Disease Father        PHYSICAL EXAM:   General  alert, oriented and appears uncomfortable  Vital Signs with Ranges  Temp: 98  F (36.7  C) Temp src: Oral  BP: 96/64 Pulse: 95   Resp: 16 SpO2: 93 % O2 Device: Nasal cannula Oxygen Delivery: 2 LPM  I/O last 3 completed shifts:  In: 10 [I.V.:10]  Out: -     Constitutional: alert, moderate distress, cooperative, and pale   Cardiovascular: negative, PMI normal. No lifts, heaves, or thrills. RRR. No murmurs, clicks gallops or rub  Respiratory: negative, Percussion normal. Good diaphragmatic excursion. Lungs clear  Abdomen: Abdomen soft, severe tenderness over abdomen in epigastric, RUQ and LUQ. BS normal. No masses, organomegaly          ADDITIONAL COMMENTS:   I reviewed the patient's new clinical lab test results.   Recent Labs   Lab Test 08/31/23  0527 08/29/23  0542 08/28/23  0525 08/26/23  1447 08/25/23  1212 08/24/23  0926 08/23/23  0114   WBC 7.7 5.6 5.3   < >  --    < > 24.6*   HGB 10.6* 8.5* 9.1*   < >  --    < > 10.2*   MCV 86 87 87   < >  --    < > 87   * 326 250   < >  --    < > 195   INR  --   --   --   --  1.18*  --  1.45*    < > = values in this interval not displayed.     Recent Labs   Lab Test 08/31/23  0527 08/30/23  0522 08/29/23  0542 08/28/23  0525   POTASSIUM 3.9 4.0 3.7 3.8   CHLORIDE 97*  --  100 104   CO2 24 --  26 26   BUN 8.7  --  6.7* 6.2*   ANIONGAP 13  --  11 10     Recent Labs   Lab Test 08/29/23  0542 08/28/23  0525 08/26/23  1010 08/25/23  0506 08/24/23  0926 08/23/23  0114   ALBUMIN  --  2.4* 2.6* 2.6*   < > 2.8*   BILITOTAL  --  0.5 0.7 0.7   < > 1.4*   ALT  --  36 60* 74*   < > 150*   AST  --  12 24 25   < > 37   LIPASE 17  --   --   --   --  8*   AMYLASE  --   --   --   --   --  22*    < > = values in this interval not displayed.       I reviewed the patient's new imaging results.        CONSULTATION ASSESSMENT AND PLAN:    Melanie Cox is a 62 year old female with a past medical history significant for COPD; HTN; CHF (HFpEF); chronic spine/disc disease with chronic pain syndrome on opioids; polysubstance abuse; chronic pressure ulcer; and GERD; who presented to OSH 8/22/2023 with  fevers with AMS and found to be in septic shock with signs of LLL pneumonia. Transferred to [facility] ICU 8/22/2023 for management. Patient was treated with antibiotics and pressors and subsequently improved. Patient was transferred to the IM service 8/23/2023.     Diarrhea, resolved   Abdominal pain   Poor appetite  Possible pancreas abnormality   Has chronic pain syndrome on chronic opioids/gabapentin  CT A/P LLL pneumonia, soft tissue ulceration lateral aspect of right hip. Severe osteopenia with small amount of osteolysis along fixation screws in the A1 vertebral body. Questionable fullness in the region of the pancreatic neck, difficult to fully evaluate on this noncontrast study particularly given the beam hardening artifact from the spinal hardware. A follow-up examination with IV contrast would be of benefit   when clinically feasible.  Pain consistent with musculoskeletal pain from coughing and retching and would benefit from therapy and ice/heat to abdomen    - daily PPI  - sucralfate  - recommend EGD with EUS to further evaluate at some point possibly outpatient pending respiratory status    Anemia  Baseline hemoglobin 14-15 earlier this year. Hgb 10.2 on admit 8/23. No overt signs of bleeding  - 10.6 today    Elevated LFTs  - improved. ALT/AST normal  H/o alcohol abuse         [facility].  Office: [phone]  Cell : [phone] (Dr. Adkins)  Cell: [phone] (Marine Chambers PA-C)

## 2023-08-31 NOTE — PLAN OF CARE
"[id number]    Oriented x4, able to conversation but sleepy, this afternoon very irritable.   VSS on 4L O2 NC. Up assist x1 GBW. LS diminished with crackles at bases. BS+, No BM today, reports abdominal pain in upper quadrants.   Urine output appears adequate, incontinent at times so not all measured.    R hip PI dressing changed by WOC RN today.  Scarring on R rib/side area from previous wounds. Some light pink blanchable on buttocks.   Educated pt on PIP, was agreeable during day to repositioning but this evening after IV dilaudid weaning per pain consult pt was very agitated, swearing and yelling at writer and refused all cares.   Re-educated pt on PIP, risk for PI if not offloading provided but pt ignored writer saying \"Ok God\"  Pt asked for pain meds and was angry when writer explained PRN tylenol, PRN morphine PO and lidocaine patch available, pt verbalized wanting dilaudid. After explaining med changes and not available agreed to take the meds available. Writer scanned and gave to pt and she picked the morphine out and then shoved the tylenol and scheduled Carafate back at writer. Educated pt on Carafate and pt still adamant she did not want to take them.    "

## 2023-08-31 NOTE — PLAN OF CARE
Goal Outcome Evaluation:      Orientations: Alert &O x 4  Diet: Reguler  Vitals/Pain: VSS on 3L O2 NC   Tele: NA  Lines/Drains: PICC line on L arm SL  Skin/Wounds: Pressure injury on R hip   GI/: adequate urine output. 1 X  BM during shift   Labs: Abnormal/Trends, Electrolyte Replacement- on K,Mg and P protocol  Ambulation/Assist: Assit x 1 GBW, bed side commote  Sleep Quality: fair  Pain Management: Dilaudid, tylenol and morphine  Plan: Stool sample for Cdiff rule out pending. Paged provider for mucolytic d/t chest tightness, congestion, and coarse crackles.

## 2023-08-31 NOTE — PROGRESS NOTES
[facility]  Gastroenterology Progress Note     Melanie Cox MRN# [id number]   YOB: 1960 Age: 62 year old          Assessment and Plan:       Septic shock (H)    Chronic abd Pain, H/O Diarrhea  Abnormal CT  H/O Respiratory failure and Pneumonia  - recommend EGD with EUS to further evaluate at some point possibly outpatient pending respiratory status          Data Unavailable      Interval History:     doing well; no cp, sob, n/v/d, or abd pain.              Review of Systems:     C: NEGATIVE for fever, chills, change in weight  E/M: NEGATIVE for ear, mouth and throat problems  R: NEGATIVE for significant cough or SOB  CV: NEGATIVE for chest pain, palpitations or peripheral edema             Medications:   I have reviewed this patient's current medications   enoxaparin ANTICOAGULANT  40 mg Subcutaneous Q24H    folic acid  1 mg Oral Daily    guaiFENesin  600 mg Oral BID    Lidocaine  1 patch Transdermal Q24H    metoprolol tartrate  50 mg Oral BID    multivitamin w/minerals  1 tablet Oral Daily    nystatin   Topical BID    pantoprazole  40 mg Oral QAM AC    sodium chloride (PF)  10 mL Intracatheter Q8H    sucralfate  1 g Oral 4x Daily    vitamin B1  100 mg Oral Daily    umeclidinium  1 puff Inhalation Daily                  Physical Exam:   Vitals were reviewed  Vital Signs with Ranges  Temp:  [97.9  F (36.6  C)-100.7  F (38.2  C)] 98.3  F (36.8  C)  Pulse:  [] 115  Resp:  [16-20] 16  BP: ()/(64-84) 96/64  SpO2:  [93 %-98 %] 98 %  I/O last 3 completed shifts:  In: 10 [I.V.:10]  Out: -   Cardiovascular: negative, PMI normal. No lifts, heaves, or thrills. RRR. No murmurs, clicks gallops or rub  Respiratory: negative, Percussion normal. Good diaphragmatic excursion. Lungs clear  Neck: Neck supple. No adenopathy. Thyroid symmetric, normal size,, Carotids without bruits.  Abdomen: Abdomen soft, non-tender. BS normal. No masses, organomegaly  SKIN: no suspicious lesions or  nely             Data:   I reviewed the patient's new clinical lab test results.   Recent Labs   Lab Test 08/31/23  0527 08/29/23  0542 08/28/23  0525 08/26/23  1447 08/25/23  1212 08/24/23  0926 08/23/23  0114   WBC 7.7 5.6 5.3   < >  --    < > 24.6*   HGB 10.6* 8.5* 9.1*   < >  --    < > 10.2*   MCV 86 87 87   < >  --    < > 87   * 326 250   < >  --    < > 195   INR  --   --   --   --  1.18*  --  1.45*    < > = values in this interval not displayed.     Recent Labs   Lab Test 08/31/23  0527 08/30/23  0522 08/29/23  0542 08/28/23  0525   POTASSIUM 3.9 4.0 3.7 3.8   CHLORIDE 97*  --  100 104   CO2 24  --  26 26   BUN 8.7  --  6.7* 6.2*   ANIONGAP 13  --  11 10     Recent Labs   Lab Test 08/29/23  0542 08/28/23  0525 08/26/23  1010 08/25/23  0506 08/24/23  0926 08/23/23  0114   ALBUMIN  --  2.4* 2.6* 2.6*   < > 2.8*   BILITOTAL  --  0.5 0.7 0.7   < > 1.4*   ALT  --  36 60* 74*   < > 150*   AST  --  12 24 25   < > 37   LIPASE 17  --   --   --   --  8*   AMYLASE  --   --   --   --   --  22*    < > = values in this interval not displayed.       I reviewed the patient's new imaging results.    All laboratory data reviewed  All imaging studies reviewed by me.    Kartik Adkins MD,  8/31/2023  [facility]  Office : [phone]  Cell: [phone]      [facility]  Ph: [phone] Fax: [phone]

## 2023-09-01 PROBLEM — R52 PAIN: Chronic | Status: ACTIVE | Noted: 2023-01-01

## 2023-09-01 NOTE — PLAN OF CARE
Goal Outcome Evaluation:    SUMMARY: septic shock d/t LL Pneumonia  DATE & TIME: 9/1/23 6962-1034   Cognitive Concerns/ Orientation : A&O x 4   BEHAVIOR & AGGRESSION TOOL COLOR: Green  CIWA SCORE: NA   ABNL VS/O2: VSS intermittent oxygen 2.5 L Pt education provided pertaining to COPD and oxygen sats.  MOBILITY: A x 1 with belt and walker. T&R. Refused repositioning at times.  PAIN MANAGMENT: c/o constant scapular/back pain - abdominal pain resolved per pt report. Gave PRN po Morphine x1 and PRN tylenol x1 - pt asked for second dose of morphine but writer refrained d/t sedation. .  DIET: Regular. Tolerating.  BOWEL/BLADDER: Continent. Ambulating to the bathroom. No BM this shift.  ABNL LAB/BG: Na 134, Hgb 10.6, Platelet ct 544  DRAIN/DEVICES: left midline SL  TELEMETRY RHYTHM: NA  SKIN: Dressing on right hip wound daily. Redness blanchable on coccyx.  TESTS/PROCEDURES: None  D/C DATE: Discharge to TCU pending  Discharge Barriers: pending respiratory status.  OTHER IMPORTANT INFO: PT/WOC/GI and SW following.  EGD/EUS recommended to be completed outpatient.

## 2023-09-01 NOTE — PLAN OF CARE
Goal Outcome Evaluation:                      DATE & TIME: 8/31/23, pm shift    Cognitive Concerns/ Orientation : A&O x 4   BEHAVIOR & AGGRESSION TOOL COLOR: Green  CIWA SCORE: NA   ABNL VS/O2: VSS except HR tachy at times (b/w 120s-130s). HR down to 100s after receiving schedule metoprolol. On 2 L of oxygen.  MOBILITY: A x 1 with belt and walker. T&R.  PAIN MANAGMENT: c/o abdominal, back and shoulders pain. Gave PRN po Morphine x 1. Using hot pack for abdominal pain. Lidocaine patches on bilateral shoulders.  DIET: Regular. Good appetite.  BOWEL/BLADDER: Continent. Ambulating to the bathroom. Had bm.  ABNL LAB/BG: Na 134, Hgb 10.6, Platelet ct 496  DRAIN/DEVICES: left midline SL  TELEMETRY RHYTHM: NA  SKIN: Dressing on right hip wound changed. Redness blanchable on coccyx, mepilex applied.  TESTS/PROCEDURES: None  D/C DATE: Discharge to TCU pending  Discharge Barriers: pending respiratory status.  OTHER IMPORTANT INFO: PT/WOC/GI and SW following. LEMUS. LS diminished posteriorly. Infrequent congested cough. On schedule Mucinex. Patient spouse called and requested Dr to call him and update on patient tomorrow. Left a sticky note to physicians.

## 2023-09-01 NOTE — PLAN OF CARE
Goal Outcome Evaluation:    SUMMARY: septic shock  DATE & TIME: 8/31/23-9/1/23 [id number]    Cognitive Concerns/ Orientation : A&O x 4   BEHAVIOR & AGGRESSION TOOL COLOR: Green  CIWA SCORE: NA   ABNL VS/O2: VSS except HR tachy at times  On 2.5 L of oxygen - sat. @ 91-92%.  MOBILITY: A x 1 with belt and walker. T&R. Refused repositioning at times.  PAIN MANAGMENT: c/o constant abdominal, back and shoulders pain. Gave PRN po Morphine x 2 - somewhat effective. Using hot pack for abdominal pain in addition. Lidocaine patches on bilateral shoulders removed this shift. Upon pain reassessment pt is often sleeping, but when asked states severe pain 8-9/10. Pt states very frustrated with pain management & prefers home regimen.  DIET: Regular. Tolerating.  BOWEL/BLADDER: Continent. Ambulating to the bathroom. BM yesterday x1.  ABNL LAB/BG: Na 134, Hgb 10.6, Platelet ct 496  DRAIN/DEVICES: left midline SL  TELEMETRY RHYTHM: NA  SKIN: Dressing on right hip wound daily. Redness blanchable on coccyx.  TESTS/PROCEDURES: None  D/C DATE: Discharge to TCU pending  Discharge Barriers: pending respiratory status.  OTHER IMPORTANT INFO: PT/WOC/GI and SW following. LEMUS. On schedule Mucinex. Patient spouse called and requested Dr to call him and update on patient tomorrow.

## 2023-09-01 NOTE — PROGRESS NOTES
[facility]  Gastroenterology Progress Note     Melanie Cox MRN# [id number]   YOB: 1960 Age: 62 year old          Assessment and Plan:     Melanie Cox is a 62 year old female with a past medical history significant for COPD; HTN; CHF (HFpEF); chronic spine/disc disease with chronic pain syndrome on opioids; polysubstance abuse; chronic pressure ulcer; and GERD; who presented to OSH 8/22/2023 with fevers with AMS and found to be in septic shock with signs of LLL pneumonia. Transferred to [facility] ICU 8/22/2023 for management. Patient was treated with antibiotics and pressors and subsequently improved. Patient was transferred to the IM service 8/23/2023.      Diarrhea, resolved   Abdominal pain   Poor appetite  Possible pancreas abnormality   Has chronic pain syndrome on chronic opioids/gabapentin  CT A/P LLL pneumonia, soft tissue ulceration lateral aspect of right hip. Severe osteopenia with small amount of osteolysis along fixation screws in the A1 vertebral body. Questionable fullness in the region of the pancreatic neck, difficult to fully evaluate on this noncontrast study particularly given the beam hardening artifact from the spinal hardware. A follow-up examination with IV contrast would be of benefit   when clinically feasible.  Pain consistent with musculoskeletal pain from coughing and retching and would benefit from therapy and ice/heat to abdomen     - daily PPI  - sucralfate  - recommend EGD with EUS to further evaluate at some point possibly outpatient pending respiratory status     Anemia  Baseline hemoglobin 14-15 earlier this year. Hgb 10.2 on admit 8/23. No overt signs of bleeding  - 10.6 today     Elevated LFTs  - improved. ALT/AST normal  H/o alcohol abuse            Interval History:     doing well; no cp, sob, n/v/d, or abd pain.              Review of Systems:     C: NEGATIVE for fever, chills, change in weight  E/M: NEGATIVE for ear, mouth and  throat problems  R: NEGATIVE for significant cough or SOB  CV: NEGATIVE for chest pain, palpitations or peripheral edema             Medications:   I have reviewed this patient's current medications   enoxaparin ANTICOAGULANT  40 mg Subcutaneous Q24H    guaiFENesin  600 mg Oral BID    Lidocaine  1 patch Transdermal Q24H    metoprolol tartrate  50 mg Oral BID    multivitamin w/minerals  1 tablet Oral Daily    nystatin   Topical BID    pantoprazole  40 mg Oral QAM AC    sodium chloride (PF)  10 mL Intracatheter Q8H    sucralfate  1 g Oral 4x Daily    umeclidinium  1 puff Inhalation Daily                  Physical Exam:   Vitals were reviewed  Vital Signs with Ranges  Temp:  [98.2  F (36.8  C)-98.9  F (37.2  C)] 98.3  F (36.8  C)  Pulse:  [] 78  Resp:  [16-20] 18  BP: ()/(54-80) 97/61  SpO2:  [91 %-98 %] 91 %  I/O last 3 completed shifts:  In: 250 [P.O.:250]  Out: -   Constitutional: healthy, alert, and no distress   Cardiovascular: negative, PMI normal. No lifts, heaves, or thrills. RRR. No murmurs, clicks gallops or rub  Respiratory: negative, Percussion normal. Good diaphragmatic excursion. Lungs clear  Abdomen: Abdomen soft, non-tender. BS normal. No masses, organomegaly           Data:   I reviewed the patient's new clinical lab test results.   Recent Labs   Lab Test 08/31/23  0527 08/29/23  0542 08/28/23  0525 08/26/23  1447 08/25/23  1212 08/24/23  0926 08/23/23  0114   WBC 7.7 5.6 5.3   < >  --    < > 24.6*   HGB 10.6* 8.5* 9.1*   < >  --    < > 10.2*   MCV 86 87 87   < >  --    < > 87   * 326 250   < >  --    < > 195   INR  --   --   --   --  1.18*  --  1.45*    < > = values in this interval not displayed.     Recent Labs   Lab Test 08/31/23  0527 08/30/23  0522 08/29/23  0542 08/28/23  0525   POTASSIUM 3.9 4.0 3.7 3.8   CHLORIDE 97*  --  100 104   CO2 24  --  26 26   BUN 8.7  --  6.7* 6.2*   ANIONGAP 13  --  11 10     Recent Labs   Lab Test 08/29/23  0542 08/28/23  0525 08/26/23  1010  08/25/23  0506 08/24/23  0926 08/23/23  0114   ALBUMIN  --  2.4* 2.6* 2.6*   < > 2.8*   BILITOTAL  --  0.5 0.7 0.7   < > 1.4*   ALT  --  36 60* 74*   < > 150*   AST  --  12 24 25   < > 37   LIPASE 17  --   --   --   --  8*   AMYLASE  --   --   --   --   --  22*    < > = values in this interval not displayed.       I reviewed the patient's new imaging results.    All laboratory data reviewed  All imaging studies reviewed by me.    Marine Chambers PA-C,  9/1/2023  [facility]  Office : [phone]  Cell: [phone] (Dr. Adkins)  Cell: [phone] (Marine Chambers PA-C)

## 2023-09-01 NOTE — PROGRESS NOTES
[facility]    Medicine Progress Note - Hospitalist Service    Date of Admission:  8/23/2023    Assessment & Plan   Melanie Cox is a 62 year old female with a past medical history significant for COPD; HTN; CHF (HFpEF); chronic spine/disc disease with chronic pain syndrome on opioids; polysubstance abuse; chronic pressure ulcer; and GERD; who presented to OSH 8/22/2023 with fevers with AMS and found to be in septic shock with signs of LLL pneumonia.  Transferred to [facility] ICU 8/22/2023 for management.  Patient was treated with antibiotics and pressors and subsequently improved.  Patient was transferred to the IM service 8/23/2023.    LLL pneumonia with septic shock and acute hypoxic respiratory failure  COPD with ongoing tobacco use  Possible UTI  Initial presentation to OSH as above. Also had UA with 11-25 WBC, small blood, small LE though mod epithelial cells, blood.  Transferred to [facility] ICU 8/22/23.  Started on vanco, pipericillin-tazobactam and doxycycline on admit. MRSA nasal PCR negative. BC's  NGTD.   On 8/23, weaned off pressors and transferred to IM team. Vanco stopped. Later in the afternoon, became hypotensive again requiring more fluids and pressors, weaned off in the evening.  Gradually improved and transferred out of ICU.  Vancomycin discontinued on 8/23/23.  Completed 5 day course of doxycycline on 8/27/23.  Completed a 7 day course of Zosyn on 8/29/23.  Weaned off supplemental oxygen on 9/1/23. She was not on home oxygen prior to admission.  Continue Incruse Ellipta (formulary substitute for spiriva) and PRN DuoNebs.  Blood cultures negative to date.  Sputum culture with normal adam.  TCU recommended for ongoing cares and therapies after discharge, however she is refusing this. There is concern that she is a fall risk, concern about her fragile respiratory status, need for wound care, etc. Her significant other has stated to nursing that he thinks she should go to  TCU. She specifically told me not to contact her significant other despite him calling in and requesting an update from the hospitalist. She wants to go home. States her significant other wants her home and will come pick her up. Requested to be discharged tonight. After concerns about discharge home, she seems to understand the risks and still wants to be discharge. Discharge orders were entered, however we subsequently found out that her significant other can't pick her up tonight, but could pick her up tomorrow. Discharge cancelled for tonight. Likely discharge tomorrow unless she changes her mind about TCU or clinical status changes.    Acute septic, metabolic and toxic encephalopathy, improved  Presented with confusion and decreased LOC. EtOH level in ED 0.048. CT head negative for acute findings.  Mental status improved. She has been conversant, A&O. Drowsy following opioids. Neuro intact.  Appears to be back to baseline on 9/1/23. Oriented and able to discuss medical condition and plan of care, although doesn't want to engage much.  Re-orient as needed.  Maintain normal day/night, sleep/wake cycles.  Minimize sedating medications as able.    Vulnerable adult  * Per report, arrived to ED covered in stool. Chart review indicated prior admission after laying on the floor for several days, concern for unsafe living situation and history of multiple vulnerable adult reports filed.   SW following.  See below regarding discharge planning.    Weakness and physical deconditioning due to multiple acute and chronic medical issues  PT and OT consulted, recommending TCU.  Encouraged participation with therapies in the hospital and consideration of TCU at the time of discharge.  She is refusing TCU. She states her significant other wants her to come home and she will go to outpatient therapy that is just a couple miles from their house. She has told me on multiple occasions not to contact her significant other regarding  discharge planning. She is alert and oriented, able to convey my concerns back to me, and appears to understand the concerns about her discharging home.    Diarrhea, resolved  Abdominal pain  Possible pancreas abnormality  Multiple loose stools on morning of 8/28/23. Also complains of abdominal pain. No fevers. WBC with in normal limits. Had been on Zosyn for the past 7 days.  Stool for c.diff was ordered 8/28/23, but no further loose stools so testing was cancelled.  CT abdomen/pelvis on 8/29/23 showed questionable fullness in the region of the pancreatic neck.  Lipase within normal limits.  GI consulted, appreciate their assistance.  Outpatient follow-up with Lucille HERNANDEZ. Consider EGD/EUS as an outpatient when recovered from acute medical issues.    Elevated LFTs, unclear etiology, question hypoperfusion from sepsis, alcohol use  * Patient is s/p cholecystectomy.  Continue to treat other issues as noted.  LFTs improved, monitor intermittently.    Hypertension (benign essential)  Chronic CHF (HFpEF)  [PTA: metoprolol 50 mg BID.]  * ECHO 3/2022 showed LV EF 73% with normal RV function.   * BNP 8/22/23 in ED elevated at 10,700, not clinically volume up. Issues with septic shock as noted. Metoprolol held on admit.  BP's more elevated 8/25, PTA metoprolol restarted.  Blood pressure better controlled, continue to monitor.  PRN IV hydralazine available for high blood pressure.  Monitor I&O and daily weights.    Anemia, suspect dilutional and/or chronic  * Baseline hemoglobin around 14-15 earlier this year. Hgb 10.2 on admit 8/23. No overt clinical signs of major bleeding.  Hemoglobin stable/improved at 10.6 on 8/31/23.  Monitor intermittently.    Coagulopathy, suspect due to decreased PO intake  * INR 1.45 on admit 8/23.  INR improved to 1.18 on 8/25/23.    Alcohol use  On 8/23, patient reported periodic drinking at home, denied daily use or history of withdrawal. Etoh level on admission 0.048 as above. No overt withdrawal  noted.  No significant withdrawal noted during this hospitalization.    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 Last seen 7/31/2023.  * On admit, wound did not appears acutely infected.  WOC consulted 8/23.  Continue local wound cares per WOC RN.  Outpatient follow-up with general surgery as previously arranged.    Chronic pain syndrome  Per chart review, there was some concern for misuse. Recently switched from oxycodone to morphine IR at appt 8/9. PTA: morphine 15mg q4 hours PRN, however also was taking prior oxycodone until it runs out. PTA also on lidocaine patch. Gabapentin on med list but patient not taking.  Pain team consulted, appreciate their assistance.  Continue lidocaine 4% patch q24h.  PRN acetaminophen available.  Previously on gabapentin, but states it did not work for her. Discussed trying Lyrica, however she was not interested.  Minimize opioids as able.  Discontinued IV dilaudid 8/31/23.  Continue PRN morphine, decrease frequency to every 6 hours PRN.  Recommend outpatient initiation of buprenorphine as recommended in the pain team consult note.     GERD  Continue PTA PPI and sucralfate.    Hypokalemia  Hypomagnesemia  Hypophosphatemia  Resolved with replacement.    Hypocalcemia  * Given IV calcium 8/23.  Resolved with replacement.    Lines, etc.  * Midline placed 8/24.  Continue midline for now, remove prior to discharge.       Diet: Regular Diet Adult  Room Service  Snacks/Supplements Adult: Magic Cup; Between Meals  Diet    DVT Prophylaxis: Enoxaparin (Lovenox) SQ  Leal Catheter: Not present  Lines: PRESENT             Cardiac Monitoring: None  Code Status: Full Code      Clinically Significant Risk Factors              # Hypoalbuminemia: Lowest albumin = 2.4 g/dL at 8/28/2023  5:25 AM, will monitor as appropriate            # Overweight: Estimated body mass index is  "25.69 kg/m  as calculated from the following:    Height as of this encounter: 1.575 m (5' 2\").    Weight as of this encounter: 63.7 kg (140 lb 6.9 oz).             Disposition Plan      Expected Discharge Date: 09/01/2023        Discharge Comments: 8/30- Psych consult, pain consult.          Zbigniew Arreaga MD  Hospitalist Service  [facility]  Securely message with [employer] (more info)  Text page via [employer] Paging/Directory   ______________________________________________________________________    Interval History   Melanie Cox was seen this afternoon. She was dismissive and mostly answered my questions with brief one word responses. She wants to go home. Will not go to TCU. Would not accept supplemental oxygen if it was ordered, she feels like she doesn't need it. She was talking with her significant other by phone when I entered the room and did not want me to talk while she was on the phone with him. I requested to call him to discuss discharge planning but she told me not to contact him. Discussed with nursing, care coordinator, and social work this evening.    Physical Exam   Vital Signs: Temp: 98.3  F (36.8  C) Temp src: Oral BP: 110/62 Pulse: 58   Resp: 18 SpO2: 91 % O2 Device: None (Room air) Oxygen Delivery: 2.5 LPM  Weight: 140 lbs 6.93 oz    Constitutional: awake, alert, no apparent distress, laying in the hospital bed, dismissive, does not want to talk much  Respiratory: no increased work of breathing, diminished at the bases  Cardiovascular: regular rate and rhythm, normal S1 and S2, no murmur noted  GI: normal bowel sounds, soft, non-distended, upper abdominal tenderness  Skin: warm, dry  Musculoskeletal: no lower extremity pitting edema present  Neurologic: awake, alert, answers questions appropriately, moves all extremities    Medical Decision Making       60 MINUTES SPENT BY ME on the date of service doing chart review, history, exam, documentation & further activities " per the note.      Data     I have personally reviewed the following data over the past 24 hrs:    N/A  \   N/A   / 544 (H)     N/A N/A N/A /  N/A   4.3 N/A 0.55 \

## 2023-09-02 NOTE — PLAN OF CARE
"Goal Outcome Evaluation:  A&OX4, VSS on 2L O2, tachycardiac, desats to 88% on RA. Up AX1 with Gb and walker. Refusing to get OOB this shift, Dressing on the R hip c/d/I. Morphine for \"pain all over the body\", pt looks comfortable. LIDO patch on both arms.States pt can repo herself in bed. On regular diet. Plan to discharge home tomorrow, pt already has walker at home, no need to order at discharge. Continue to monitor.                        "

## 2023-09-02 NOTE — PLAN OF CARE
Goal Outcome Evaluation:  SUMMARY: septic shock d/t LL Pneumonia  DATE & TIME: 9/1/23-9/2/23 [id number]   Cognitive Concerns/ Orientation : A&O x 4   BEHAVIOR & AGGRESSION TOOL COLOR: Green  CIWA SCORE: NA   ABNL VS/O2: VSS, O2 NC 2-2.5 L Pt education provided pertaining to COPD and oxygen sats.  MOBILITY: A x 1 with belt and walker. T&R. Refused, Pt states can self reposition  PAIN MANAGMENT: c/o constant scapular/back pain . PRN po Morphine x3, offered Tylenol but pt declined started she wants only Morphine that Tylenol does not help.  DIET: Regular.   BOWEL/BLADDER: Continent. Ambulating to the bathroom. No BM this shift.  ABNL LAB/BG: Na 134, Hgb 10.6, Platelet ct 544  DRAIN/DEVICES: left midline SL  TELEMETRY RHYTHM: NA  SKIN: Dressing on right hip wound daily. Redness blanchable on coccyx.  TESTS/PROCEDURES: None  D/C DATE: Discharge to TCU pending  Discharge Barriers: pending respiratory status.  OTHER IMPORTANT INFO: PT/WOC/GI and SW following.  EGD/EUS recommended to be completed outpatient.         .

## 2023-09-02 NOTE — PROGRESS NOTES
"Care Management Discharge Note    Discharge Date: 09/05/2023       Discharge Disposition: Home with significant other     Discharge Services:      Discharge DME:      Discharge Transportation: family or friend will provide    Private pay costs discussed: Not applicable    Does the patient's insurance plan have a 3 day qualifying hospital stay waiver?  No    PAS Confirmation Code:    Patient/family educated on Medicare website which has current facility and service quality ratings:  NA    Education Provided on the Discharge Plan: Yes  Persons Notified of Discharge Plans: pt, charge nurse  Patient/Family in Agreement with the Plan:  is not agreement with TCU or HC.    Handoff Referral Completed: No    Additional Information:  SW discharge orders in place for patient to discharge home. SW met with patient and attempted to discuss discharge plans and she stated \"none of your business\" and did not allow for SW to coordinate with significant other. Patient noted that she is aware of what is good for her and reports that's she has all the help she needs, can do PT about 4 miles away from her house and has home care for wound care and does not need anything else at this time. Patient's significant other to transport.     AMRVEL Edge        "

## 2023-09-02 NOTE — PROGRESS NOTES
[facility]  Gastroenterology Progress Note     Melanie Cox MRN# [id number]   YOB: 1960 Age: 62 year old          Assessment and Plan:     Melanie Cox is a 62 year old female with a past medical history significant for COPD; HTN; CHF (HFpEF); chronic spine/disc disease with chronic pain syndrome on opioids; polysubstance abuse; chronic pressure ulcer; and GERD; who presented to OSH 8/22/2023 with fevers with AMS and found to be in septic shock with signs of LLL pneumonia. Transferred to [facility] ICU 8/22/2023 for management. Patient was treated with antibiotics and pressors and subsequently improved. Patient was transferred to the IM service 8/23/2023.      Diarrhea, resolved   Abdominal pain   Poor appetite  Possible pancreas abnormality   Has chronic pain syndrome on chronic opioids/gabapentin  CT A/P LLL pneumonia, soft tissue ulceration lateral aspect of right hip. Severe osteopenia with small amount of osteolysis along fixation screws in the A1 vertebral body. Questionable fullness in the region of the pancreatic neck, difficult to fully evaluate on this noncontrast study particularly given the beam hardening artifact from the spinal hardware. A follow-up examination with IV contrast would be of benefit   when clinically feasible.  Pain consistent with musculoskeletal pain from coughing and retching and would benefit from therapy and ice/heat to abdomen     - 9/2/23- daily PPI  - sucralfate  - recommend EGD with EUS as outpatient to further evaluate possible pancreatic abnormality  - GI will sign off service    Anemia  Baseline hemoglobin 14-15 earlier this year. Hgb 10.2 on admit 8/23. No overt signs of bleeding  - 10.6 today     Elevated LFTs   on 8/28/23 ( improved)- ALT/AST normal  H/o alcohol abuse            Interval History:     doing well; no cp, sob, n/v/d, reports some mild epigastric pain that radiates into right back.              Review of Systems:      C: NEGATIVE for fever, chills, change in weight  E/M: NEGATIVE for ear, mouth and throat problems  R: NEGATIVE for significant cough or SOB  CV: NEGATIVE for chest pain, palpitations or peripheral edema             Medications:   I have reviewed this patient's current medications   enoxaparin ANTICOAGULANT  40 mg Subcutaneous Q24H    guaiFENesin  600 mg Oral BID    Lidocaine  1 patch Transdermal Q24H    metoprolol tartrate  50 mg Oral BID    multivitamin w/minerals  1 tablet Oral Daily    nystatin   Topical BID    pantoprazole  40 mg Oral QAM AC    sodium chloride (PF)  10 mL Intracatheter Q8H    sucralfate  1 g Oral 4x Daily    umeclidinium  1 puff Inhalation Daily                  Physical Exam:   Vitals were reviewed  Vital Signs with Ranges  Temp:  [97.7  F (36.5  C)-98.3  F (36.8  C)] 98  F (36.7  C)  Pulse:  [] 100  Resp:  [16-18] 16  BP: (104-128)/(60-76) 119/76  SpO2:  [88 %-94 %] 91 %  I/O last 3 completed shifts:  In: 240 [P.O.:240]  Out: -   Constitutional: healthy, alert, and no distress   Cardiovascular: negative, PMI normal. No lifts, heaves, or thrills. RRR. No murmurs, clicks gallops or rub  Respiratory: negative, Percussion normal. Good diaphragmatic excursion. Lungs clear  Abdomen: Abdomen soft, non-tender. BS normal. No masses, organomegaly           Data:   I reviewed the patient's new clinical lab test results.   Recent Labs   Lab Test 09/01/23  1232 08/31/23  0527 08/29/23  0542 08/28/23  0525 08/26/23  1447 08/25/23  1212 08/24/23  0926 08/23/23  0114   WBC  --  7.7 5.6 5.3   < >  --    < > 24.6*   HGB  --  10.6* 8.5* 9.1*   < >  --    < > 10.2*   MCV  --  86 87 87   < >  --    < > 87   * 495* 326 250   < >  --    < > 195   INR  --   --   --   --   --  1.18*  --  1.45*    < > = values in this interval not displayed.       Recent Labs   Lab Test 09/01/23  1232 08/31/23  0527 08/30/23  0522 08/29/23  0542 08/28/23  0525   POTASSIUM 4.3 3.9 4.0 3.7 3.8   CHLORIDE  --  97*  --   100 104   CO2  --  24  --  26 26   BUN  --  8.7  --  6.7* 6.2*   ANIONGAP  --  13  --  11 10       Recent Labs   Lab Test 08/29/23  0542 08/28/23  0525 08/26/23  1010 08/25/23  0506 08/24/23  0926 08/23/23  0114   ALBUMIN  --  2.4* 2.6* 2.6*   < > 2.8*   BILITOTAL  --  0.5 0.7 0.7   < > 1.4*   ALT  --  36 60* 74*   < > 150*   AST  --  12 24 25   < > 37   LIPASE 17  --   --   --   --  8*   AMYLASE  --   --   --   --   --  22*    < > = values in this interval not displayed.         I reviewed the patient's new imaging results.  All laboratory data reviewed  All imaging studies reviewed by me.    [facility]  Office : [phone]  Cell: [phone] (Dr. Adkins)  Cell: [phone] (Kirstie Jade PA-C)

## 2023-09-05 NOTE — PLAN OF CARE
Physical Therapy Discharge Summary    Reason for therapy discharge:    Discharged to home.    Progress towards therapy goal(s). See goals on Care Plan in [facility] electronic health record for goal details.  Goals not met.  Barriers to achieving goals:   discharge from facility.    Therapy recommendation(s):    Continued therapy is recommended.  Rationale/Recommendations:   .     PT Discharge Planning:    PT Plan: Progress gait with FWW and O2 monitoring, LE strengthening, repeated sit>stands  PT Discharge Recommendation (DC Rec): Transitional Care Facility, home with assist, home with home care physical therapy  PT Rationale for DC Rec: At baseline, pt lives in a home with a ramp with her significant other. Pt does not provide details on her PLOF or amount of assistance that is available at discharge. Pt with limited participation in therapy session. Pt able to complete transfers CGA and ambulation with FWW x ~80  feet. Pt would benefit from TCU at discharge if agreeable to participate in therapy and limited assistance available. If patient declines TCU or participation in therapy, pt may be able to discharge home with SO providing 24 hour supervision and assist, along with HHPT.  PT Brief overview of current status: Bed mob CGA, transfers/gait CGA with FWW and unsteady overall    Recommendation above provided by last treating therapist.

## 2023-09-06 NOTE — PROGRESS NOTES
Clinic Care Coordination Contact  [facility]/Voicemail       Clinical Data: Care Coordinator Outreach  Outreach attempted x 2.  Left message on patient's voicemail with call back information and requested return call.    Plan: Care Coordinator will do no further outreaches at this time.    MARVEL Aden  Connected Care Resource Center  [facility]     *Connected Care Resource Team does NOT follow patient ongoing. Referrals are identified based on internal discharge reports and the outreach is to ensure patient has an understanding of their discharge instructions.

## 2024-01-01 ENCOUNTER — LAB REQUISITION (OUTPATIENT)
Dept: LAB | Facility: CLINIC | Age: 64
End: 2024-01-01
Payer: COMMERCIAL

## 2024-01-01 DIAGNOSIS — J09.X2 INFLUENZA DUE TO IDENTIFIED NOVEL INFLUENZA A VIRUS WITH OTHER RESPIRATORY MANIFESTATIONS: ICD-10-CM

## 2024-01-01 LAB
ANION GAP SERPL CALCULATED.3IONS-SCNC: 13 MMOL/L (ref 7–15)
BUN SERPL-MCNC: 28.9 MG/DL (ref 8–23)
CALCIUM SERPL-MCNC: 9.6 MG/DL (ref 8.8–10.2)
CHLORIDE SERPL-SCNC: 101 MMOL/L (ref 98–107)
CREAT SERPL-MCNC: 0.53 MG/DL (ref 0.51–0.95)
DEPRECATED HCO3 PLAS-SCNC: 24 MMOL/L (ref 22–29)
EGFRCR SERPLBLD CKD-EPI 2021: >90 ML/MIN/1.73M2
GLUCOSE SERPL-MCNC: 89 MG/DL (ref 70–99)
POTASSIUM SERPL-SCNC: 4.1 MMOL/L (ref 3.4–5.3)
SODIUM SERPL-SCNC: 138 MMOL/L (ref 135–145)

## 2024-01-01 PROCEDURE — 80048 BASIC METABOLIC PNL TOTAL CA: CPT | Mod: ORL

## 2024-01-01 PROCEDURE — 36415 COLL VENOUS BLD VENIPUNCTURE: CPT | Mod: ORL

## 2024-01-01 PROCEDURE — P9603 ONE-WAY ALLOW PRORATED MILES: HCPCS | Mod: ORL
